# Patient Record
Sex: FEMALE | Race: WHITE | NOT HISPANIC OR LATINO | Employment: UNEMPLOYED | URBAN - NONMETROPOLITAN AREA
[De-identification: names, ages, dates, MRNs, and addresses within clinical notes are randomized per-mention and may not be internally consistent; named-entity substitution may affect disease eponyms.]

---

## 2020-02-25 ENCOUNTER — HOSPITAL ENCOUNTER (INPATIENT)
Facility: HOSPITAL | Age: 25
LOS: 6 days | Discharge: NURSING, REHAB OR LONG-TERM CARE | End: 2020-03-02
Attending: PSYCHIATRY & NEUROLOGY | Admitting: PSYCHIATRY & NEUROLOGY

## 2020-02-25 ENCOUNTER — HOSPITAL ENCOUNTER (EMERGENCY)
Facility: HOSPITAL | Age: 25
Discharge: STILL ADMITTED OR ADMINISTRATIVE | End: 2020-02-25
Attending: EMERGENCY MEDICINE

## 2020-02-25 VITALS
SYSTOLIC BLOOD PRESSURE: 115 MMHG | DIASTOLIC BLOOD PRESSURE: 70 MMHG | TEMPERATURE: 98.6 F | WEIGHT: 103 LBS | HEIGHT: 64 IN | HEART RATE: 103 BPM | RESPIRATION RATE: 20 BRPM | BODY MASS INDEX: 17.58 KG/M2 | OXYGEN SATURATION: 98 %

## 2020-02-25 DIAGNOSIS — F11.10 OPIATE ABUSE, CONTINUOUS (HCC): Primary | ICD-10-CM

## 2020-02-25 DIAGNOSIS — F41.0 SEVERE ANXIETY WITH PANIC: Primary | ICD-10-CM

## 2020-02-25 PROBLEM — F29 PSYCHOSIS (HCC): Status: ACTIVE | Noted: 2020-02-25

## 2020-02-25 LAB
6-ACETYL MORPHINE: NEGATIVE
ALBUMIN SERPL-MCNC: 4.14 G/DL (ref 3.5–5.2)
ALBUMIN/GLOB SERPL: 1.4 G/DL
ALP SERPL-CCNC: 97 U/L (ref 39–117)
ALT SERPL W P-5'-P-CCNC: 9 U/L (ref 1–33)
AMPHET+METHAMPHET UR QL: NEGATIVE
ANION GAP SERPL CALCULATED.3IONS-SCNC: 10.7 MMOL/L (ref 5–15)
AST SERPL-CCNC: 18 U/L (ref 1–32)
B-HCG UR QL: NEGATIVE
BACTERIA UR QL AUTO: ABNORMAL /HPF
BARBITURATES UR QL SCN: NEGATIVE
BASOPHILS # BLD AUTO: 0.06 10*3/MM3 (ref 0–0.2)
BASOPHILS NFR BLD AUTO: 0.7 % (ref 0–1.5)
BENZODIAZ UR QL SCN: NEGATIVE
BILIRUB SERPL-MCNC: <0.2 MG/DL (ref 0.2–1.2)
BILIRUB UR QL STRIP: NEGATIVE
BUN BLD-MCNC: 8 MG/DL (ref 6–20)
BUN/CREAT SERPL: 9.5 (ref 7–25)
BUPRENORPHINE SERPL-MCNC: POSITIVE NG/ML
CALCIUM SPEC-SCNC: 9.1 MG/DL (ref 8.6–10.5)
CANNABINOIDS SERPL QL: NEGATIVE
CHLORIDE SERPL-SCNC: 101 MMOL/L (ref 98–107)
CLARITY UR: ABNORMAL
CO2 SERPL-SCNC: 25.3 MMOL/L (ref 22–29)
COCAINE UR QL: NEGATIVE
COLOR UR: YELLOW
CREAT BLD-MCNC: 0.84 MG/DL (ref 0.57–1)
DEPRECATED RDW RBC AUTO: 41.4 FL (ref 37–54)
EOSINOPHIL # BLD AUTO: 0.73 10*3/MM3 (ref 0–0.4)
EOSINOPHIL NFR BLD AUTO: 8 % (ref 0.3–6.2)
ERYTHROCYTE [DISTWIDTH] IN BLOOD BY AUTOMATED COUNT: 12.2 % (ref 12.3–15.4)
ETHANOL BLD-MCNC: <10 MG/DL (ref 0–10)
ETHANOL UR QL: <0.01 %
GFR SERPL CREATININE-BSD FRML MDRD: 82 ML/MIN/1.73
GLOBULIN UR ELPH-MCNC: 3 GM/DL
GLUCOSE BLD-MCNC: 117 MG/DL (ref 65–99)
GLUCOSE UR STRIP-MCNC: NEGATIVE MG/DL
HAV IGM SERPL QL IA: NORMAL
HBV CORE IGM SERPL QL IA: NORMAL
HBV SURFACE AG SERPL QL IA: NORMAL
HCT VFR BLD AUTO: 36.1 % (ref 34–46.6)
HCV AB SER DONR QL: NORMAL
HGB BLD-MCNC: 11.5 G/DL (ref 12–15.9)
HGB UR QL STRIP.AUTO: NEGATIVE
HIV1+2 AB SER QL: NORMAL
HYALINE CASTS UR QL AUTO: ABNORMAL /LPF
IMM GRANULOCYTES # BLD AUTO: 0.03 10*3/MM3 (ref 0–0.05)
IMM GRANULOCYTES NFR BLD AUTO: 0.3 % (ref 0–0.5)
KETONES UR QL STRIP: NEGATIVE
LEUKOCYTE ESTERASE UR QL STRIP.AUTO: ABNORMAL
LYMPHOCYTES # BLD AUTO: 3.09 10*3/MM3 (ref 0.7–3.1)
LYMPHOCYTES NFR BLD AUTO: 34 % (ref 19.6–45.3)
MAGNESIUM SERPL-MCNC: 2.1 MG/DL (ref 1.6–2.6)
MCH RBC QN AUTO: 29.3 PG (ref 26.6–33)
MCHC RBC AUTO-ENTMCNC: 31.9 G/DL (ref 31.5–35.7)
MCV RBC AUTO: 91.9 FL (ref 79–97)
METHADONE UR QL SCN: NEGATIVE
MONOCYTES # BLD AUTO: 0.69 10*3/MM3 (ref 0.1–0.9)
MONOCYTES NFR BLD AUTO: 7.6 % (ref 5–12)
NEUTROPHILS # BLD AUTO: 4.48 10*3/MM3 (ref 1.7–7)
NEUTROPHILS NFR BLD AUTO: 49.4 % (ref 42.7–76)
NITRITE UR QL STRIP: NEGATIVE
NRBC BLD AUTO-RTO: 0 /100 WBC (ref 0–0.2)
OPIATES UR QL: NEGATIVE
OXYCODONE UR QL SCN: NEGATIVE
PCP UR QL SCN: NEGATIVE
PH UR STRIP.AUTO: >=9 [PH] (ref 5–8)
PLATELET # BLD AUTO: 337 10*3/MM3 (ref 140–450)
PMV BLD AUTO: 9.5 FL (ref 6–12)
POTASSIUM BLD-SCNC: 4.4 MMOL/L (ref 3.5–5.2)
PROT SERPL-MCNC: 7.1 G/DL (ref 6–8.5)
PROT UR QL STRIP: NEGATIVE
RBC # BLD AUTO: 3.93 10*6/MM3 (ref 3.77–5.28)
RBC # UR: ABNORMAL /HPF
REF LAB TEST METHOD: ABNORMAL
SODIUM BLD-SCNC: 137 MMOL/L (ref 136–145)
SP GR UR STRIP: 1.01 (ref 1–1.03)
SQUAMOUS #/AREA URNS HPF: ABNORMAL /HPF
TRI-PHOS CRY URNS QL MICRO: ABNORMAL /HPF
UROBILINOGEN UR QL STRIP: ABNORMAL
WBC NRBC COR # BLD: 9.08 10*3/MM3 (ref 3.4–10.8)
WBC UR QL AUTO: ABNORMAL /HPF

## 2020-02-25 PROCEDURE — 80307 DRUG TEST PRSMV CHEM ANLYZR: CPT | Performed by: EMERGENCY MEDICINE

## 2020-02-25 PROCEDURE — 81001 URINALYSIS AUTO W/SCOPE: CPT | Performed by: EMERGENCY MEDICINE

## 2020-02-25 PROCEDURE — 80053 COMPREHEN METABOLIC PANEL: CPT | Performed by: EMERGENCY MEDICINE

## 2020-02-25 PROCEDURE — 85025 COMPLETE CBC W/AUTO DIFF WBC: CPT | Performed by: EMERGENCY MEDICINE

## 2020-02-25 PROCEDURE — 81025 URINE PREGNANCY TEST: CPT | Performed by: EMERGENCY MEDICINE

## 2020-02-25 PROCEDURE — 83735 ASSAY OF MAGNESIUM: CPT | Performed by: EMERGENCY MEDICINE

## 2020-02-25 PROCEDURE — 93010 ELECTROCARDIOGRAM REPORT: CPT | Performed by: INTERNAL MEDICINE

## 2020-02-25 PROCEDURE — 25010000002 INFLUENZA VAC SUBUNIT QUAD 0.5 ML SUSPENSION PREFILLED SYRINGE: Performed by: PSYCHIATRY & NEUROLOGY

## 2020-02-25 PROCEDURE — 93005 ELECTROCARDIOGRAM TRACING: CPT | Performed by: PSYCHIATRY & NEUROLOGY

## 2020-02-25 PROCEDURE — 80074 ACUTE HEPATITIS PANEL: CPT | Performed by: PSYCHIATRY & NEUROLOGY

## 2020-02-25 PROCEDURE — G0008 ADMIN INFLUENZA VIRUS VAC: HCPCS | Performed by: PSYCHIATRY & NEUROLOGY

## 2020-02-25 PROCEDURE — 90674 CCIIV4 VAC NO PRSV 0.5 ML IM: CPT | Performed by: PSYCHIATRY & NEUROLOGY

## 2020-02-25 PROCEDURE — G0432 EIA HIV-1/HIV-2 SCREEN: HCPCS | Performed by: PSYCHIATRY & NEUROLOGY

## 2020-02-25 RX ORDER — ONDANSETRON 4 MG/1
4 TABLET, ORALLY DISINTEGRATING ORAL EVERY 8 HOURS PRN
COMMUNITY
End: 2020-03-02 | Stop reason: HOSPADM

## 2020-02-25 RX ORDER — BENZTROPINE MESYLATE 1 MG/ML
1 INJECTION INTRAMUSCULAR; INTRAVENOUS ONCE AS NEEDED
Status: DISCONTINUED | OUTPATIENT
Start: 2020-02-25 | End: 2020-02-26

## 2020-02-25 RX ORDER — CYCLOBENZAPRINE HCL 10 MG
10 TABLET ORAL EVERY 8 HOURS PRN
COMMUNITY
End: 2020-03-02 | Stop reason: HOSPADM

## 2020-02-25 RX ORDER — CYCLOBENZAPRINE HCL 10 MG
10 TABLET ORAL EVERY 8 HOURS PRN
Status: CANCELLED | OUTPATIENT
Start: 2020-02-25

## 2020-02-25 RX ORDER — ACETAMINOPHEN 325 MG/1
650 TABLET ORAL EVERY 6 HOURS PRN
Status: DISCONTINUED | OUTPATIENT
Start: 2020-02-25 | End: 2020-03-02 | Stop reason: HOSPADM

## 2020-02-25 RX ORDER — ACETAMINOPHEN 500 MG
500 TABLET ORAL EVERY 4 HOURS PRN
COMMUNITY
End: 2020-03-02 | Stop reason: HOSPADM

## 2020-02-25 RX ORDER — BENZTROPINE MESYLATE 1 MG/1
2 TABLET ORAL ONCE AS NEEDED
Status: DISCONTINUED | OUTPATIENT
Start: 2020-02-25 | End: 2020-02-26

## 2020-02-25 RX ORDER — CALCIUM CARBONATE 200(500)MG
2 TABLET,CHEWABLE ORAL
Status: CANCELLED | OUTPATIENT
Start: 2020-02-25

## 2020-02-25 RX ORDER — HYDROXYZINE 50 MG/1
50 TABLET, FILM COATED ORAL EVERY 6 HOURS PRN
Status: DISCONTINUED | OUTPATIENT
Start: 2020-02-25 | End: 2020-02-26

## 2020-02-25 RX ORDER — CALCIUM CARBONATE 200(500)MG
2 TABLET,CHEWABLE ORAL
Status: DISCONTINUED | OUTPATIENT
Start: 2020-02-25 | End: 2020-03-02 | Stop reason: HOSPADM

## 2020-02-25 RX ORDER — BUSPIRONE HYDROCHLORIDE 10 MG/1
10 TABLET ORAL 2 TIMES DAILY
Status: DISCONTINUED | OUTPATIENT
Start: 2020-02-25 | End: 2020-02-26

## 2020-02-25 RX ORDER — HYDROXYZINE HYDROCHLORIDE 25 MG/1
25 TABLET, FILM COATED ORAL EVERY 8 HOURS PRN
Status: CANCELLED | OUTPATIENT
Start: 2020-02-25

## 2020-02-25 RX ORDER — FAMOTIDINE 20 MG/1
20 TABLET, FILM COATED ORAL 2 TIMES DAILY PRN
Status: DISCONTINUED | OUTPATIENT
Start: 2020-02-25 | End: 2020-03-02 | Stop reason: HOSPADM

## 2020-02-25 RX ORDER — ACETAMINOPHEN 500 MG
500 TABLET ORAL EVERY 4 HOURS PRN
Status: CANCELLED | OUTPATIENT
Start: 2020-02-25

## 2020-02-25 RX ORDER — ONDANSETRON 4 MG/1
4 TABLET, FILM COATED ORAL EVERY 6 HOURS PRN
Status: DISCONTINUED | OUTPATIENT
Start: 2020-02-25 | End: 2020-03-02 | Stop reason: HOSPADM

## 2020-02-25 RX ORDER — BUSPIRONE HYDROCHLORIDE 10 MG/1
10 TABLET ORAL 2 TIMES DAILY
COMMUNITY
End: 2020-03-02 | Stop reason: HOSPADM

## 2020-02-25 RX ORDER — BENZONATATE 100 MG/1
100 CAPSULE ORAL 3 TIMES DAILY PRN
Status: DISCONTINUED | OUTPATIENT
Start: 2020-02-25 | End: 2020-03-02 | Stop reason: HOSPADM

## 2020-02-25 RX ORDER — ALUMINA, MAGNESIA, AND SIMETHICONE 2400; 2400; 240 MG/30ML; MG/30ML; MG/30ML
15 SUSPENSION ORAL EVERY 6 HOURS PRN
Status: DISCONTINUED | OUTPATIENT
Start: 2020-02-25 | End: 2020-03-02 | Stop reason: HOSPADM

## 2020-02-25 RX ORDER — MAGNESIUM HYDROXIDE/ALUMINUM HYDROXICE/SIMETHICONE 120; 1200; 1200 MG/30ML; MG/30ML; MG/30ML
30 SUSPENSION ORAL EVERY 4 HOURS PRN
Status: CANCELLED | OUTPATIENT
Start: 2020-02-25

## 2020-02-25 RX ORDER — TRAZODONE HYDROCHLORIDE 50 MG/1
50 TABLET ORAL NIGHTLY PRN
Status: DISCONTINUED | OUTPATIENT
Start: 2020-02-25 | End: 2020-02-28

## 2020-02-25 RX ORDER — IBUPROFEN 400 MG/1
400 TABLET ORAL EVERY 6 HOURS PRN
Status: DISCONTINUED | OUTPATIENT
Start: 2020-02-25 | End: 2020-02-26

## 2020-02-25 RX ORDER — DOXEPIN HYDROCHLORIDE 50 MG/1
50 CAPSULE ORAL NIGHTLY PRN
COMMUNITY
End: 2020-03-02 | Stop reason: HOSPADM

## 2020-02-25 RX ORDER — MAGNESIUM HYDROXIDE/ALUMINUM HYDROXICE/SIMETHICONE 120; 1200; 1200 MG/30ML; MG/30ML; MG/30ML
30 SUSPENSION ORAL EVERY 4 HOURS PRN
COMMUNITY
End: 2020-03-02 | Stop reason: HOSPADM

## 2020-02-25 RX ORDER — LORAZEPAM 2 MG/ML
2 INJECTION INTRAMUSCULAR EVERY 8 HOURS PRN
Status: DISCONTINUED | OUTPATIENT
Start: 2020-02-25 | End: 2020-02-26

## 2020-02-25 RX ORDER — CALCIUM CARBONATE 200(500)MG
2 TABLET,CHEWABLE ORAL
COMMUNITY
End: 2020-03-02 | Stop reason: HOSPADM

## 2020-02-25 RX ORDER — CYCLOBENZAPRINE HCL 10 MG
10 TABLET ORAL EVERY 8 HOURS PRN
Status: DISCONTINUED | OUTPATIENT
Start: 2020-02-25 | End: 2020-03-02 | Stop reason: HOSPADM

## 2020-02-25 RX ORDER — DOXEPIN HYDROCHLORIDE 25 MG/1
50 CAPSULE ORAL NIGHTLY PRN
Status: DISCONTINUED | OUTPATIENT
Start: 2020-02-25 | End: 2020-02-26

## 2020-02-25 RX ORDER — BUSPIRONE HYDROCHLORIDE 10 MG/1
10 TABLET ORAL 2 TIMES DAILY
Status: CANCELLED | OUTPATIENT
Start: 2020-02-25

## 2020-02-25 RX ORDER — HALOPERIDOL 5 MG/ML
5 INJECTION INTRAMUSCULAR EVERY 8 HOURS PRN
Status: DISCONTINUED | OUTPATIENT
Start: 2020-02-25 | End: 2020-02-26

## 2020-02-25 RX ORDER — ECHINACEA PURPUREA EXTRACT 125 MG
2 TABLET ORAL AS NEEDED
Status: DISCONTINUED | OUTPATIENT
Start: 2020-02-25 | End: 2020-03-02 | Stop reason: HOSPADM

## 2020-02-25 RX ORDER — HYDROXYZINE PAMOATE 25 MG/1
25 CAPSULE ORAL EVERY 8 HOURS PRN
COMMUNITY
End: 2020-03-02 | Stop reason: HOSPADM

## 2020-02-25 RX ORDER — NICOTINE 21 MG/24HR
1 PATCH, TRANSDERMAL 24 HOURS TRANSDERMAL
Status: DISCONTINUED | OUTPATIENT
Start: 2020-02-25 | End: 2020-03-02 | Stop reason: HOSPADM

## 2020-02-25 RX ORDER — ONDANSETRON 4 MG/1
4 TABLET, ORALLY DISINTEGRATING ORAL EVERY 8 HOURS PRN
Status: CANCELLED | OUTPATIENT
Start: 2020-02-25

## 2020-02-25 RX ORDER — DOXEPIN HYDROCHLORIDE 25 MG/1
50 CAPSULE ORAL NIGHTLY PRN
Status: CANCELLED | OUTPATIENT
Start: 2020-02-25

## 2020-02-25 RX ORDER — LOPERAMIDE HYDROCHLORIDE 2 MG/1
2 CAPSULE ORAL
Status: DISCONTINUED | OUTPATIENT
Start: 2020-02-25 | End: 2020-03-02 | Stop reason: HOSPADM

## 2020-02-25 RX ORDER — DIPHENHYDRAMINE HYDROCHLORIDE 50 MG/ML
50 INJECTION INTRAMUSCULAR; INTRAVENOUS EVERY 8 HOURS PRN
Status: ACTIVE | OUTPATIENT
Start: 2020-02-25 | End: 2020-03-01

## 2020-02-25 RX ADMIN — ACETAMINOPHEN 650 MG: 325 TABLET ORAL at 17:32

## 2020-02-25 RX ADMIN — HYDROXYZINE HYDROCHLORIDE 50 MG: 50 TABLET ORAL at 17:32

## 2020-02-25 RX ADMIN — BUSPIRONE HYDROCHLORIDE 10 MG: 10 TABLET ORAL at 21:14

## 2020-02-25 RX ADMIN — NICOTINE 1 PATCH: 21 PATCH TRANSDERMAL at 17:34

## 2020-02-25 RX ADMIN — INFLUENZA A VIRUS A/SINGAPORE/GP1908/2015 IVR-180 (H1N1) ANTIGEN (MDCK CELL DERIVED, PROPIOLACTONE INACTIVATED), INFLUENZA A VIRUS A/NORTH CAROLINA/04/2016 (H3N2) HEMAGGLUTININ ANTIGEN (MDCK CELL DERIVED, PROPIOLACTONE INACTIVATED), INFLUENZA B VIRUS B/IOWA/06/2017 HEMAGGLUTININ ANTIGEN (MDCK CELL DERIVED, PROPIOLACTONE INACTIVATED), INFLUENZA B VIRUS B/SINGAPORE/INFTT-16-0610/2016 HEMAGGLUTININ ANTIGEN (MDCK CELL DERIVED, PROPIOLACTONE INACTIVATED) 0.5 ML: 15; 15; 15; 15 INJECTION, SUSPENSION INTRAMUSCULAR at 21:13

## 2020-02-25 RX ADMIN — TRAZODONE HYDROCHLORIDE 50 MG: 50 TABLET ORAL at 21:17

## 2020-02-26 PROBLEM — F11.10 OPIATE ABUSE, CONTINUOUS: Status: ACTIVE | Noted: 2020-02-26

## 2020-02-26 PROBLEM — F20.1: Status: ACTIVE | Noted: 2020-02-26

## 2020-02-26 PROBLEM — F15.10 METHAMPHETAMINE ABUSE (HCC): Status: ACTIVE | Noted: 2020-02-26

## 2020-02-26 PROBLEM — F19.20 POLYSUBSTANCE DEPENDENCE INCLUDING OPIOID TYPE DRUG WITH COMPLICATION, EPISODIC ABUSE: Status: ACTIVE | Noted: 2020-02-26

## 2020-02-26 LAB — HOLD SPECIMEN: NORMAL

## 2020-02-26 PROCEDURE — 99223 1ST HOSP IP/OBS HIGH 75: CPT | Performed by: PSYCHIATRY & NEUROLOGY

## 2020-02-26 RX ORDER — BUPRENORPHINE HYDROCHLORIDE AND NALOXONE HYDROCHLORIDE DIHYDRATE 8; 2 MG/1; MG/1
0.5 TABLET SUBLINGUAL DAILY
Status: DISCONTINUED | OUTPATIENT
Start: 2020-02-26 | End: 2020-03-02 | Stop reason: HOSPADM

## 2020-02-26 RX ORDER — ARIPIPRAZOLE 10 MG/1
5 TABLET ORAL DAILY
Status: DISCONTINUED | OUTPATIENT
Start: 2020-02-26 | End: 2020-02-28

## 2020-02-26 RX ORDER — OLANZAPINE 5 MG/1
5 TABLET, ORALLY DISINTEGRATING ORAL 3 TIMES DAILY PRN
Status: DISCONTINUED | OUTPATIENT
Start: 2020-02-26 | End: 2020-03-02 | Stop reason: HOSPADM

## 2020-02-26 RX ADMIN — BUSPIRONE HYDROCHLORIDE 10 MG: 10 TABLET ORAL at 08:13

## 2020-02-26 RX ADMIN — ARIPIPRAZOLE 5 MG: 10 TABLET ORAL at 14:21

## 2020-02-26 RX ADMIN — HYDROXYZINE HYDROCHLORIDE 50 MG: 50 TABLET ORAL at 11:48

## 2020-02-26 RX ADMIN — NICOTINE 1 PATCH: 21 PATCH TRANSDERMAL at 08:13

## 2020-02-26 RX ADMIN — ACETAMINOPHEN 650 MG: 325 TABLET ORAL at 14:21

## 2020-02-26 RX ADMIN — BUPRENORPHINE HYDROCHLORIDE AND NALOXONE HYDROCHLORIDE 0.5 TABLET: 8; 2 TABLET SUBLINGUAL at 14:22

## 2020-02-26 RX ADMIN — ACETAMINOPHEN 650 MG: 325 TABLET ORAL at 20:03

## 2020-02-26 RX ADMIN — TRAZODONE HYDROCHLORIDE 50 MG: 50 TABLET ORAL at 20:03

## 2020-02-26 RX ADMIN — ACETAMINOPHEN 650 MG: 325 TABLET ORAL at 08:13

## 2020-02-26 RX ADMIN — OLANZAPINE 5 MG: 5 TABLET, ORALLY DISINTEGRATING ORAL at 20:03

## 2020-02-27 PROCEDURE — 99232 SBSQ HOSP IP/OBS MODERATE 35: CPT | Performed by: PSYCHIATRY & NEUROLOGY

## 2020-02-27 PROCEDURE — 63710000001 ONDANSETRON PER 8 MG: Performed by: PSYCHIATRY & NEUROLOGY

## 2020-02-27 RX ADMIN — ARIPIPRAZOLE 5 MG: 10 TABLET ORAL at 08:32

## 2020-02-27 RX ADMIN — TRAZODONE HYDROCHLORIDE 50 MG: 50 TABLET ORAL at 20:04

## 2020-02-27 RX ADMIN — ONDANSETRON 4 MG: 4 TABLET ORAL at 13:18

## 2020-02-27 RX ADMIN — NICOTINE 1 PATCH: 21 PATCH TRANSDERMAL at 08:37

## 2020-02-27 RX ADMIN — ACETAMINOPHEN 650 MG: 325 TABLET ORAL at 14:32

## 2020-02-27 RX ADMIN — ACETAMINOPHEN 650 MG: 325 TABLET ORAL at 20:05

## 2020-02-27 RX ADMIN — ACETAMINOPHEN 650 MG: 325 TABLET ORAL at 08:37

## 2020-02-27 RX ADMIN — OLANZAPINE 5 MG: 5 TABLET, ORALLY DISINTEGRATING ORAL at 20:04

## 2020-02-27 RX ADMIN — BUPRENORPHINE HYDROCHLORIDE AND NALOXONE HYDROCHLORIDE 0.5 TABLET: 8; 2 TABLET SUBLINGUAL at 09:30

## 2020-02-28 PROCEDURE — 25010000003 ARIPIPRAZOLE LAUROXIL ER 675 MG/2.4ML PREFILLED SYRINGE: Performed by: PSYCHIATRY & NEUROLOGY

## 2020-02-28 PROCEDURE — 99233 SBSQ HOSP IP/OBS HIGH 50: CPT | Performed by: PSYCHIATRY & NEUROLOGY

## 2020-02-28 PROCEDURE — 25010000003 ARIPIPRAZOLE LAUROXIL ER 662 MG/2.4ML PREFILLED SYRINGE: Performed by: PSYCHIATRY & NEUROLOGY

## 2020-02-28 RX ORDER — ARIPIPRAZOLE 15 MG/1
30 TABLET ORAL DAILY
Status: COMPLETED | OUTPATIENT
Start: 2020-02-28 | End: 2020-02-28

## 2020-02-28 RX ORDER — CLONAZEPAM 0.5 MG/1
0.5 TABLET ORAL 2 TIMES DAILY
Status: COMPLETED | OUTPATIENT
Start: 2020-02-28 | End: 2020-03-01

## 2020-02-28 RX ORDER — TRAZODONE HYDROCHLORIDE 50 MG/1
100 TABLET ORAL NIGHTLY PRN
Status: DISCONTINUED | OUTPATIENT
Start: 2020-02-28 | End: 2020-03-02 | Stop reason: HOSPADM

## 2020-02-28 RX ADMIN — TRAZODONE HYDROCHLORIDE 100 MG: 50 TABLET ORAL at 20:14

## 2020-02-28 RX ADMIN — CLONAZEPAM 0.5 MG: 0.5 TABLET ORAL at 13:00

## 2020-02-28 RX ADMIN — ACETAMINOPHEN 650 MG: 325 TABLET ORAL at 08:41

## 2020-02-28 RX ADMIN — ARIPIPRAZOLE 5 MG: 10 TABLET ORAL at 08:41

## 2020-02-28 RX ADMIN — NICOTINE 1 PATCH: 21 PATCH TRANSDERMAL at 08:43

## 2020-02-28 RX ADMIN — ACETAMINOPHEN 650 MG: 325 TABLET ORAL at 15:42

## 2020-02-28 RX ADMIN — ARIPIPRAZOLE LAUROXIL 675 MG: 675 INJECTION, SUSPENSION, EXTENDED RELEASE INTRAMUSCULAR at 16:57

## 2020-02-28 RX ADMIN — ARIPIPRAZOLE 30 MG: 15 TABLET ORAL at 13:18

## 2020-02-28 RX ADMIN — BUPRENORPHINE HYDROCHLORIDE AND NALOXONE HYDROCHLORIDE 0.5 TABLET: 8; 2 TABLET SUBLINGUAL at 08:40

## 2020-02-28 RX ADMIN — CLONAZEPAM 0.5 MG: 0.5 TABLET ORAL at 20:11

## 2020-02-28 RX ADMIN — ARIPIPRAZOLE LAUROXIL 662 MG: 662 INJECTION, SUSPENSION, EXTENDED RELEASE INTRAMUSCULAR at 16:56

## 2020-02-29 PROCEDURE — 99233 SBSQ HOSP IP/OBS HIGH 50: CPT | Performed by: PSYCHIATRY & NEUROLOGY

## 2020-02-29 RX ORDER — FLUTICASONE PROPIONATE 50 MCG
2 SPRAY, SUSPENSION (ML) NASAL DAILY
Status: DISCONTINUED | OUTPATIENT
Start: 2020-02-29 | End: 2020-03-02 | Stop reason: HOSPADM

## 2020-02-29 RX ORDER — EMOLLIENT COMBINATION NO.92
LOTION (ML) TOPICAL
Status: DISCONTINUED | OUTPATIENT
Start: 2020-02-29 | End: 2020-03-02 | Stop reason: HOSPADM

## 2020-02-29 RX ADMIN — ACETAMINOPHEN 650 MG: 325 TABLET ORAL at 08:29

## 2020-02-29 RX ADMIN — ACETAMINOPHEN 650 MG: 325 TABLET ORAL at 16:49

## 2020-02-29 RX ADMIN — TRAZODONE HYDROCHLORIDE 100 MG: 50 TABLET ORAL at 20:46

## 2020-02-29 RX ADMIN — BUPRENORPHINE HYDROCHLORIDE AND NALOXONE HYDROCHLORIDE 0.5 TABLET: 8; 2 TABLET SUBLINGUAL at 08:53

## 2020-02-29 RX ADMIN — CLONAZEPAM 0.5 MG: 0.5 TABLET ORAL at 20:44

## 2020-02-29 RX ADMIN — CLONAZEPAM 0.5 MG: 0.5 TABLET ORAL at 08:53

## 2020-02-29 RX ADMIN — FLUTICASONE PROPIONATE 2 SPRAY: 50 SPRAY, METERED NASAL at 16:19

## 2020-02-29 RX ADMIN — NICOTINE 1 PATCH: 21 PATCH TRANSDERMAL at 08:53

## 2020-03-01 PROCEDURE — 99232 SBSQ HOSP IP/OBS MODERATE 35: CPT | Performed by: PSYCHIATRY & NEUROLOGY

## 2020-03-01 RX ADMIN — ACETAMINOPHEN 650 MG: 325 TABLET ORAL at 08:33

## 2020-03-01 RX ADMIN — ACETAMINOPHEN 650 MG: 325 TABLET ORAL at 00:27

## 2020-03-01 RX ADMIN — CYCLOBENZAPRINE HYDROCHLORIDE 10 MG: 10 TABLET, FILM COATED ORAL at 20:27

## 2020-03-01 RX ADMIN — FLUTICASONE PROPIONATE 2 SPRAY: 50 SPRAY, METERED NASAL at 08:32

## 2020-03-01 RX ADMIN — CLONAZEPAM 0.5 MG: 0.5 TABLET ORAL at 08:33

## 2020-03-01 RX ADMIN — Medication: at 00:27

## 2020-03-01 RX ADMIN — NICOTINE 1 PATCH: 21 PATCH TRANSDERMAL at 08:33

## 2020-03-01 RX ADMIN — TRAZODONE HYDROCHLORIDE 100 MG: 50 TABLET ORAL at 20:24

## 2020-03-01 RX ADMIN — BUPRENORPHINE HYDROCHLORIDE AND NALOXONE HYDROCHLORIDE 0.5 TABLET: 8; 2 TABLET SUBLINGUAL at 08:32

## 2020-03-01 RX ADMIN — ACETAMINOPHEN 650 MG: 325 TABLET ORAL at 16:09

## 2020-03-01 NOTE — PLAN OF CARE
Problem: Patient Care Overview  Goal: Plan of Care Review  Outcome: Ongoing (interventions implemented as appropriate)  Flowsheets  Taken 3/1/2020 0444  Progress: no change  Outcome Summary: Pt rates anxiety 8/10 depression 5/10. Denies SI HI AVH.  Taken 2/29/2020 2010  Plan of Care Reviewed With: patient  Patient Agreement with Plan of Care: agrees

## 2020-03-01 NOTE — PLAN OF CARE
Problem: Patient Care Overview  Goal: Plan of Care Review  Outcome: Ongoing (interventions implemented as appropriate)  Flowsheets (Taken 3/1/2020 1524)  Progress: no change  Plan of Care Reviewed With: patient  Patient Agreement with Plan of Care: agrees  Note:   NO BEHAVIOR OUTBURSTS OR ISSUES DURING THIS SHIFT.

## 2020-03-01 NOTE — PROGRESS NOTES
"INPATIENT PSYCHIATRIC PROGRESS NOTE    Name:  Katie Lindsey  :  1995  MRN:  [id number]  Visit Number:  [id number]  Length of stay:  5    Behavioral Health Treatment Plan and Problem List: I have reviewed and approved the Behavioral Health Treatment Plan and Problem list.    SUBJECTIVE    CC/ Focus of exam: Psychosis/substance abuse    Patient's subjective status: \"Good but anxious\"    INTERVAL HISTORY:   Patient somewhat improved over yesterday with less tangential and less rapid speech.  She continues to change topics frequently but is able to maintain focus on 1 topic for an extended amount of time before switching which she is an improvement over yesterday.  She reports that her skin is improving with hydration and lotion but is not itching.  I advised that this is normal and she has Vistaril available which could help with some of the itching.  She would like to be prescribed trazodone as she finds it very helpful for sleep.    [facility] .    depression rating 3/10  Anxiety rating 2/10  Sleep: Good  Withdrawal sx: None  Craving: \"not really\".        Review of Systems   Constitutional: Negative.    Respiratory: Negative.    Cardiovascular: Negative.    Gastrointestinal: Negative.    Musculoskeletal: Negative.    Skin: Positive for rash (dry skin with rash on bilateral calves).   Neurological: Negative.    Psychiatric/Behavioral: Positive for confusion, decreased concentration, dysphoric mood, hallucinations and sleep disturbance (improved). The patient is nervous/anxious and is hyperactive.          OBJECTIVE    Temp:  [97.6 °F (36.4 °C)-98.1 °F (36.7 °C)] 98.1 °F (36.7 °C)  Heart Rate:  [] 100  Resp:  [18] 18  BP: ()/(59-69) 102/63    MENTAL STATUS EXAM:      Appearance:Casually dressed, good hygeine.   Cooperation:Cooperative  Psychomotor: No psychomotor agitation/retardation, No EPS, No motor tics  Speech-elevated rate, " tangential   Mood/Affect:  Elevated  Thought Processes:  tangential and coherent  Thought Content:  Delusional but improving  Hallucination(s): auditory and visual though not demonstrated today  Hopelessness: No  Optimistic:minimally  Suicidal Thoughts:   Not present  Suicidal Plan/Intent:  No  Homicidal Thoughts:  absent  Orientation: oriented x 3  Memory: Immediate, recent, recent remote, remote intact    Lab Results (last 24 hours)     ** No results found for the last 24 hours. **           Imaging Results (Last 24 Hours)     ** No results found for the last 24 hours. **           ECG/EMG Results (most recent)     Procedure Component Value Units Date/Time    ECG 12 Lead [[id number]] Collected:  02/25/20 1739     Updated:  02/26/20 0935    Narrative:       Test Reason : Baseline Cardiac Status  Blood Pressure : **/** mmHG  Vent. Rate : 086 BPM     Atrial Rate : 086 BPM     P-R Int : 156 ms          QRS Dur : 086 ms      QT Int : 382 ms       P-R-T Axes : 058 076 048 degrees     QTc Int : 457 ms    Normal sinus rhythm  Normal ECG  No previous ECGs available  Confirmed by Matthew Aleman ([id number]) on 2/26/2020 9:34:55 AM    Referred By:  KAILEY           Confirmed By:Matthew Aleman           ALLERGIES: Codeine; Ibuprofen; and Penicillins      Current Facility-Administered Medications:   •  acetaminophen (TYLENOL) tablet 650 mg, 650 mg, Oral, Q6H PRN, Iggy Underwood MD, 650 mg at 03/01/20 0833  •  aluminum-magnesium hydroxide-simethicone (MAALOX MAX) 400-400-40 MG/5ML suspension 15 mL, 15 mL, Oral, Q6H PRN, Iggy Underwood MD  •  benzonatate (TESSALON) capsule 100 mg, 100 mg, Oral, TID PRN, Iggy Underwood MD  •  buprenorphine-naloxone (SUBOXONE) 8-2 MG per SL tablet 0.5 tablet, 0.5 tablet, Sublingual, Daily, Augustine Arguello MD, 0.5 tablet at 03/01/20 0832  •  calcium carbonate (TUMS) chewable tablet 500 mg (200 mg elemental), 2 tablet, Oral, Q2H PRN, Iggy Underwood MD  •  cyclobenzaprine  (FLEXERIL) tablet 10 mg, 10 mg, Oral, Q8H PRN, Iggy Underwood MD  •  diphenhydrAMINE (BENADRYL) injection 50 mg, 50 mg, Intramuscular, Q8H PRN, Iggy Underwood MD  •  famotidine (PEPCID) tablet 20 mg, 20 mg, Oral, BID PRN, Iggy Underwood MD  •  fluticasone (FLONASE) 50 MCG/ACT nasal spray 2 spray, 2 spray, Each Nare, Daily, Iggy Underwood MD, 2 spray at 03/01/20 0832  •  loperamide (IMODIUM) capsule 2 mg, 2 mg, Oral, Q2H PRN, Iggy Underwood MD  •  lubrisoft lotion, , Apply externally, Q1H PRN, Iggy Underwood MD  •  magnesium hydroxide (MILK OF MAGNESIA) suspension 2400 mg/10mL 10 mL, 10 mL, Oral, Daily PRN, Iggy Underwood MD  •  nicotine (NICODERM CQ) 21 MG/24HR patch 1 patch, 1 patch, Transdermal, Q24H, Iggy Underwood MD, 1 patch at 03/01/20 0833  •  OLANZapine zydis (zyPREXA) disintegrating tablet 5 mg, 5 mg, Oral, TID PRN, Augustine Arguello MD, 5 mg at 02/27/20 2004  •  ondansetron (ZOFRAN) tablet 4 mg, 4 mg, Oral, Q6H PRN, Iggy Underwood MD, 4 mg at 02/27/20 1318  •  sodium chloride nasal spray 2 spray, 2 spray, Each Nare, PRN, Iggy Underwood MD  •  traZODone (DESYREL) tablet 100 mg, 100 mg, Oral, Nightly PRN, Gt Salgado MD, 100 mg at 02/29/20 2046    First time seeing patient.  Chart, notes, vitals, labs and EKG personally reviewed.      ASSESSMENT & PLAN  Schizophrenia, hebephrenic type (CMS/HCC)   -Transitioned to COHEN aripiprazole yesterday, Aristada and one-time 30 mg p.o. Dose  -Patient continues to be tangential and highly anxious.    - Placed on brief clonazepam taper for anxiety  - individual and group psychotherapy      Polysubstance dependence including opioid type drug with complication, episodic abuse (CMS/HCC)  To include recovery principles in the psychotherapeutic approach plus anticipate [facility], patient wants to continue the Suboxone MTA approach      Methamphetamine abuse  (CMS/[facility])  Include recoverage principles and psychotherapeutic approach      Opiate abuse, continuous on agonist therapy (CMS/[facility])  Same as above including the Suboxone.    Dry skin with associated rash on bilateral calves  -Started lotion as needed for hydration  -That dry skin is improving, patient experiencing some itching.  Advised that she had Vistaril as needed which would help with itching as well as anxiety.    Cough with upper respiratory congestion  -Started Flonase PRN     Insomnia  -Patient reports that PRN trazodone has been helpful and would like a prescription for this at discharge    Special precautions: Special Precautions Level 3 (q15 min checks)     Behavioral Health Treatment Plan and Problem List: I have reviewed and approved the Behavioral Health Treatment Plan and Problem list.    Clinician:  Iggy Underwood MD  03/01/20  1:39 PM    Dictated utilizing Dragon dictation

## 2020-03-02 VITALS
TEMPERATURE: 97.6 F | HEIGHT: 64 IN | BODY MASS INDEX: 17.55 KG/M2 | OXYGEN SATURATION: 97 % | RESPIRATION RATE: 18 BRPM | WEIGHT: 102.8 LBS | HEART RATE: 93 BPM | DIASTOLIC BLOOD PRESSURE: 59 MMHG | SYSTOLIC BLOOD PRESSURE: 95 MMHG

## 2020-03-02 PROCEDURE — 99239 HOSP IP/OBS DSCHRG MGMT >30: CPT | Performed by: PSYCHIATRY & NEUROLOGY

## 2020-03-02 RX ORDER — EMOLLIENT COMBINATION NO.92
LOTION (ML) TOPICAL
Qty: 473 ML | Refills: 0 | Status: ON HOLD | OUTPATIENT
Start: 2020-03-02 | End: 2020-04-02

## 2020-03-02 RX ORDER — TRAZODONE HYDROCHLORIDE 100 MG/1
100 TABLET ORAL NIGHTLY
Qty: 30 TABLET | Refills: 0 | Status: ON HOLD | OUTPATIENT
Start: 2020-03-02 | End: 2020-04-02

## 2020-03-02 RX ORDER — BUPRENORPHINE HYDROCHLORIDE AND NALOXONE HYDROCHLORIDE DIHYDRATE 8; 2 MG/1; MG/1
0.5 TABLET SUBLINGUAL DAILY
Qty: 15 TABLET | Refills: 0 | Status: ON HOLD | OUTPATIENT
Start: 2020-03-03 | End: 2020-04-02

## 2020-03-02 RX ADMIN — NICOTINE 1 PATCH: 21 PATCH TRANSDERMAL at 08:37

## 2020-03-02 RX ADMIN — BUPRENORPHINE HYDROCHLORIDE AND NALOXONE HYDROCHLORIDE 0.5 TABLET: 8; 2 TABLET SUBLINGUAL at 08:37

## 2020-03-02 RX ADMIN — ACETAMINOPHEN 650 MG: 325 TABLET ORAL at 05:26

## 2020-03-02 RX ADMIN — ACETAMINOPHEN 650 MG: 325 TABLET ORAL at 11:58

## 2020-03-02 RX ADMIN — FLUTICASONE PROPIONATE 2 SPRAY: 50 SPRAY, METERED NASAL at 08:38

## 2020-03-02 NOTE — PROGRESS NOTES
..Bridge Session  Date: 03/02/2020   Time: 1100    Data:  Reason for Inpatient Admission: Anxiety, suicidal ideation, psychosis    Follow up: [facility]  [address] [place] [address]  [phone]    March 2 2020 at 2:00pm        Coping Skills to Utilize: Patient encouraged to use thought reframing, exercise and engaging her relapse prevention planning while attending [employer]    Crisis Safety Plan:  • Support System to utilize and contact numbers: Patient reports that she plans to return to [facility] and reports the staff there are supportive    • Educated on crisis hotline numbers (yes/no): Yes    • Was the Patient made aware of contact information for the following: community mental health centers, crisis stabilization programs, residential programs, , etc (yes/no): Yes    Will transportation be a barrier (yes/no): No  • If so, explain solution(s) to resolve barrier: n/a    • How and where will the patient obtain prescribed medications: Patient's medications were filled today by [facility] and brought to patient.  The cost of the medications was covered by her insurance.    Assessment: Patient denies suicidal ideation today and denies homicidal ideation today.  Patient does not appear to be responding to internal stimuli.  She reports decreased anxiety and depression.  She verbalized being ready [facility].  She verbalizes understanding of the importance of safety planning and aftercare.    Plan:    Discussed the importance of follow up treatment for continuity of care. The Patient was able to verbalize understanding and commitment to the individualized aftercare and crisis safety plan.      Trudy Hogan, [facility]

## 2020-03-02 NOTE — PROGRESS NOTES
1220:     Therapist staffed case with Dr. Salgado and met with patient at bedside. Patient requests to be discharged on Klonopin.  Therapist/navigator contacted [facility] and learned that they do not allow Klonopin.  Patient was educated this date.  Patient was encouraged to comply with [facility] and to focus on healthy coping skills for anxiety. Patient receptive. Patient denies SI/HI and agrees to enter [facility] at 2 p.m.    Assisted patient in identifying risk factors which would indicate the need for higher level of care including thoughts to harm self or others and/or self-harming behavior and encouraged patient to call 911, or present to the nearest emergency room should any of these events occur. Discussed crisis intervention services and means to access.  Patient adamantly and convincingly denies current suicidal or homicidal ideation or perceptual disturbance.

## 2020-03-02 NOTE — PLAN OF CARE
Problem: Patient Care Overview  Goal: Plan of Care Review  Outcome: Ongoing (interventions implemented as appropriate)  Flowsheets  Taken 3/2/2020 0223  Progress: no change  Outcome Summary: Pt rates anxiety 6/10 depression 5/10. Denies SI HI AVH.  Taken 3/1/2020 1950  Plan of Care Reviewed With: patient  Patient Agreement with Plan of Care: agrees

## 2020-03-07 NOTE — DISCHARGE SUMMARY
"      PSYCHIATRIC DISCHARGE SUMMARY     Patient Identification:  Name:  Katie Lindsey  Age:  24 y.o.  Sex:  female  :  1995  MRN:  [id number]  Visit Number:  [id number]    Date of Admission:2020   Date of Discharge: 3/2/2020     Discharge Diagnosis:  Active Problems:    Schizophrenia, hebephrenic type (CMS/[facility])    Polysubstance dependence including opioid type drug with complication, episodic abuse (CMS/[facility])    Methamphetamine abuse (CMS/[facility])    Opiate abuse, continuous (CMS/[facility])      Admission Diagnosis:  Psychosis (CMS/[facility]) [F29]     Hospital Course  Patient is a 24 y.o. female presented with anxiety, psychosis, SI. She had been admitted to inpatient psychiatric facilities at least nine times prior for psychosis, substance abuse and other issues. She was started on aripiprazole, transitioned to COHEN formulation on 20, tolerated it well. She was maintained on Suboxone 4mg daily. She showed good improvement and was accepted to [facility]. She is due for her next Aristada injection on 2020.     On the day of discharge, patient denied SI, HI or AVH.  Patient showed improvement of presenting symptoms and was deemed appropriate for discharge today.    Mental Status Exam upon discharge:   Mood \"better\"   Affect-congruent, appropriate, stable  Thought Content-goal directed, no delusional material present  Thought process-linear, organized.  Suicidality: No SI  Homicidality: No HI  Perception: No AH/VH    Procedures Performed         Consults:   Consults     No orders found from 2020 to 2020.          Pertinent Test Results:   Lab Results (last 7 days)     Procedure Component Value Units Date/Time    Hepatitis Panel, Acute [[id number]] Collected:  20    Specimen:  Blood Updated:  20 09    Narrative:       The following orders were created for panel order Hepatitis Panel, Acute.  Procedure                               Abnormality         Status                   "   ---------                               -----------         ------                     Hepatitis Panel, Acute[[id number]]       Normal              Final result               Hep B Confirmation Tube[[id number]]                          Final result                 Please view results for these tests on the individual orders.    Hep B Confirmation Tube [[id number]] Collected:  02/25/20 2053    Specimen:  Blood Updated:  02/26/20 0900     Extra Tube Hold for add-ons.     Comment: Auto resulted.       HIV-1 / O / 2 Ag / Antibody 4th Generation [[id number]]  (Normal) Collected:  02/25/20 2053    Specimen:  Blood Updated:  02/25/20 2147     HIV-1/ HIV-2 Non-Reactive     Comment: A non-reactive test result does not preclude the possibility of exposure to HIV or infection with HIV. An antibody response to recent exposure may take several months to reach detectable levels.       Narrative:       The HIV antibody/antigen combo assay is a qualitative assay for HIV that includes the p24 antigen as well as antibodies to HIV types 1 and 2. This test is intended to be used as a screening assay in the diagnosis of HIV infection in patients over the age of 2.  Results may be falsely decreased if patient taking Biotin.      Hepatitis Panel, Acute [[id number]]  (Normal) Collected:  02/25/20 2053    Specimen:  Blood Updated:  02/25/20 2137     Hepatitis B Surface Ag Non-Reactive     Hep A IgM Non-Reactive     Hep B C IgM Non-Reactive     Hepatitis C Ab Non-Reactive    Narrative:       Results may be falsely decreased if patient taking Biotin.           Condition on Discharge:  improved    Vital Signs       Discharge Disposition:  Rehab Facility or Unit (DC - External)    Discharge Medications:     Discharge Medications      New Medications      Instructions Start Date   ARIPiprazole Lauroxil  MG/2.4ML intramuscular injection  Commonly known as:  ARISTADA   662 mg, Intramuscular, Once   Start Date:  March 27, 2020      buprenorphine-naloxone 8-2 MG per SL tablet  Commonly known as:  SUBOXONE   0.5 tablets, Sublingual, Daily      lubrisoft lotion lotion   Apply externally, Every 1 Hour PRN      traZODone 100 MG tablet  Commonly known as:  DESYREL   100 mg, Oral, Nightly         Stop These Medications    acetaminophen 500 MG tablet  Commonly known as:  TYLENOL     aluminum-magnesium hydroxide-simethicone 200-200-20 MG/5ML suspension  Commonly known as:  MAALOX/MYLANTA     busPIRone 10 MG tablet  Commonly known as:  BUSPAR     calcium carbonate 500 MG chewable tablet  Commonly known as:  TUMS     cyclobenzaprine 10 MG tablet  Commonly known as:  FLEXERIL     doxepin 50 MG capsule  Commonly known as:  SINEquan     hydrOXYzine pamoate 25 MG capsule  Commonly known as:  VISTARIL     ondansetron ODT 4 MG disintegrating tablet  Commonly known as:  ZOFRAN-ODT            Discharge Diet: Normal  Diet Instructions     AS TOLERATED                 Activity at Discharge: Normal  Activity Instructions     AS TOLERATED                 Follow-up Appointments  No future appointments.      Test Results Pending at Discharge  None     Clinician:   Gt Salgado MD  03/07/20  6:41 PM

## 2020-04-02 ENCOUNTER — HOSPITAL ENCOUNTER (EMERGENCY)
Facility: HOSPITAL | Age: 25
Discharge: STILL ADMITTED OR ADMINISTRATIVE | End: 2020-04-02
Attending: FAMILY MEDICINE

## 2020-04-02 ENCOUNTER — HOSPITAL ENCOUNTER (INPATIENT)
Facility: HOSPITAL | Age: 25
LOS: 4 days | Discharge: HOME | End: 2020-04-06
Attending: PSYCHIATRY & NEUROLOGY | Admitting: PSYCHIATRY & NEUROLOGY

## 2020-04-02 VITALS
RESPIRATION RATE: 16 BRPM | BODY MASS INDEX: 17.75 KG/M2 | WEIGHT: 104 LBS | HEIGHT: 64 IN | OXYGEN SATURATION: 100 % | DIASTOLIC BLOOD PRESSURE: 78 MMHG | HEART RATE: 65 BPM | TEMPERATURE: 97.6 F | SYSTOLIC BLOOD PRESSURE: 116 MMHG

## 2020-04-02 DIAGNOSIS — R45.851 SUICIDAL IDEATION: Primary | ICD-10-CM

## 2020-04-02 PROBLEM — F32.9 MDD (MAJOR DEPRESSIVE DISORDER): Status: ACTIVE | Noted: 2020-04-02

## 2020-04-02 LAB
6-ACETYL MORPHINE: NEGATIVE
ALBUMIN SERPL-MCNC: 4.69 G/DL (ref 3.5–5.2)
ALBUMIN/GLOB SERPL: 1.7 G/DL
ALP SERPL-CCNC: 64 U/L (ref 39–117)
ALT SERPL W P-5'-P-CCNC: 6 U/L (ref 1–33)
AMPHET+METHAMPHET UR QL: POSITIVE
ANION GAP SERPL CALCULATED.3IONS-SCNC: 12.3 MMOL/L (ref 5–15)
APAP SERPL-MCNC: <5 MCG/ML (ref 10–30)
AST SERPL-CCNC: 10 U/L (ref 1–32)
BARBITURATES UR QL SCN: NEGATIVE
BASOPHILS # BLD AUTO: 0.07 10*3/MM3 (ref 0–0.2)
BASOPHILS NFR BLD AUTO: 0.6 % (ref 0–1.5)
BENZODIAZ UR QL SCN: NEGATIVE
BILIRUB SERPL-MCNC: 0.2 MG/DL (ref 0.2–1.2)
BILIRUB UR QL STRIP: NEGATIVE
BUN BLD-MCNC: 6 MG/DL (ref 6–20)
BUN/CREAT SERPL: 8.8 (ref 7–25)
BUPRENORPHINE SERPL-MCNC: POSITIVE NG/ML
CALCIUM SPEC-SCNC: 9.3 MG/DL (ref 8.6–10.5)
CANNABINOIDS SERPL QL: NEGATIVE
CHLORIDE SERPL-SCNC: 105 MMOL/L (ref 98–107)
CLARITY UR: CLEAR
CO2 SERPL-SCNC: 22.7 MMOL/L (ref 22–29)
COCAINE UR QL: NEGATIVE
COLOR UR: YELLOW
CREAT BLD-MCNC: 0.68 MG/DL (ref 0.57–1)
DEPRECATED RDW RBC AUTO: 40.9 FL (ref 37–54)
EOSINOPHIL # BLD AUTO: 0.11 10*3/MM3 (ref 0–0.4)
EOSINOPHIL NFR BLD AUTO: 1 % (ref 0.3–6.2)
ERYTHROCYTE [DISTWIDTH] IN BLOOD BY AUTOMATED COUNT: 12.6 % (ref 12.3–15.4)
ETHANOL BLD-MCNC: <10 MG/DL (ref 0–10)
ETHANOL UR QL: <0.01 %
GFR SERPL CREATININE-BSD FRML MDRD: 104 ML/MIN/1.73
GLOBULIN UR ELPH-MCNC: 2.7 GM/DL
GLUCOSE BLD-MCNC: 95 MG/DL (ref 65–99)
GLUCOSE UR STRIP-MCNC: NEGATIVE MG/DL
HCG SERPL QL: NEGATIVE
HCT VFR BLD AUTO: 34.7 % (ref 34–46.6)
HGB BLD-MCNC: 11.3 G/DL (ref 12–15.9)
HGB UR QL STRIP.AUTO: NEGATIVE
IMM GRANULOCYTES # BLD AUTO: 0.03 10*3/MM3 (ref 0–0.05)
IMM GRANULOCYTES NFR BLD AUTO: 0.3 % (ref 0–0.5)
KETONES UR QL STRIP: NEGATIVE
LEUKOCYTE ESTERASE UR QL STRIP.AUTO: NEGATIVE
LYMPHOCYTES # BLD AUTO: 3.09 10*3/MM3 (ref 0.7–3.1)
LYMPHOCYTES NFR BLD AUTO: 28 % (ref 19.6–45.3)
MCH RBC QN AUTO: 29.3 PG (ref 26.6–33)
MCHC RBC AUTO-ENTMCNC: 32.6 G/DL (ref 31.5–35.7)
MCV RBC AUTO: 89.9 FL (ref 79–97)
METHADONE UR QL SCN: NEGATIVE
MONOCYTES # BLD AUTO: 0.69 10*3/MM3 (ref 0.1–0.9)
MONOCYTES NFR BLD AUTO: 6.2 % (ref 5–12)
NEUTROPHILS # BLD AUTO: 7.06 10*3/MM3 (ref 1.7–7)
NEUTROPHILS NFR BLD AUTO: 63.9 % (ref 42.7–76)
NITRITE UR QL STRIP: NEGATIVE
NRBC BLD AUTO-RTO: 0 /100 WBC (ref 0–0.2)
OPIATES UR QL: NEGATIVE
OXYCODONE UR QL SCN: NEGATIVE
PCP UR QL SCN: NEGATIVE
PH UR STRIP.AUTO: 7.5 [PH] (ref 5–8)
PLATELET # BLD AUTO: 343 10*3/MM3 (ref 140–450)
PMV BLD AUTO: 10 FL (ref 6–12)
POTASSIUM BLD-SCNC: 3.5 MMOL/L (ref 3.5–5.2)
PROT SERPL-MCNC: 7.4 G/DL (ref 6–8.5)
PROT UR QL STRIP: NEGATIVE
RBC # BLD AUTO: 3.86 10*6/MM3 (ref 3.77–5.28)
SALICYLATES SERPL-MCNC: 0.4 MG/DL
SODIUM BLD-SCNC: 140 MMOL/L (ref 136–145)
SP GR UR STRIP: 1.01 (ref 1–1.03)
UROBILINOGEN UR QL STRIP: NORMAL
WBC NRBC COR # BLD: 11.05 10*3/MM3 (ref 3.4–10.8)

## 2020-04-02 PROCEDURE — 80307 DRUG TEST PRSMV CHEM ANLYZR: CPT | Performed by: FAMILY MEDICINE

## 2020-04-02 PROCEDURE — 93005 ELECTROCARDIOGRAM TRACING: CPT | Performed by: PSYCHIATRY & NEUROLOGY

## 2020-04-02 PROCEDURE — 81003 URINALYSIS AUTO W/O SCOPE: CPT | Performed by: FAMILY MEDICINE

## 2020-04-02 PROCEDURE — 80053 COMPREHEN METABOLIC PANEL: CPT | Performed by: FAMILY MEDICINE

## 2020-04-02 PROCEDURE — 85025 COMPLETE CBC W/AUTO DIFF WBC: CPT | Performed by: FAMILY MEDICINE

## 2020-04-02 PROCEDURE — 99284 EMERGENCY DEPT VISIT MOD MDM: CPT

## 2020-04-02 PROCEDURE — 84703 CHORIONIC GONADOTROPIN ASSAY: CPT | Performed by: FAMILY MEDICINE

## 2020-04-02 RX ORDER — TRAZODONE HYDROCHLORIDE 50 MG/1
100 TABLET ORAL NIGHTLY
Status: CANCELLED | OUTPATIENT
Start: 2020-04-02

## 2020-04-02 RX ORDER — BENZTROPINE MESYLATE 1 MG/ML
1 INJECTION INTRAMUSCULAR; INTRAVENOUS ONCE AS NEEDED
Status: DISCONTINUED | OUTPATIENT
Start: 2020-04-02 | End: 2020-04-03

## 2020-04-02 RX ORDER — CLONIDINE HYDROCHLORIDE 0.1 MG/1
0.1 TABLET ORAL 4 TIMES DAILY PRN
Status: ACTIVE | OUTPATIENT
Start: 2020-04-03 | End: 2020-04-04

## 2020-04-02 RX ORDER — CLONIDINE HYDROCHLORIDE 0.1 MG/1
0.1 TABLET ORAL 4 TIMES DAILY PRN
Status: ACTIVE | OUTPATIENT
Start: 2020-04-02 | End: 2020-04-03

## 2020-04-02 RX ORDER — TRAZODONE HYDROCHLORIDE 50 MG/1
50 TABLET ORAL NIGHTLY PRN
Status: DISCONTINUED | OUTPATIENT
Start: 2020-04-02 | End: 2020-04-03

## 2020-04-02 RX ORDER — ONDANSETRON 4 MG/1
4 TABLET, FILM COATED ORAL EVERY 6 HOURS PRN
Status: DISCONTINUED | OUTPATIENT
Start: 2020-04-02 | End: 2020-04-06 | Stop reason: HOSPADM

## 2020-04-02 RX ORDER — NICOTINE 21 MG/24HR
1 PATCH, TRANSDERMAL 24 HOURS TRANSDERMAL
Status: DISCONTINUED | OUTPATIENT
Start: 2020-04-03 | End: 2020-04-06 | Stop reason: HOSPADM

## 2020-04-02 RX ORDER — TRAZODONE HYDROCHLORIDE 100 MG/1
100 TABLET ORAL NIGHTLY
COMMUNITY
End: 2020-04-06 | Stop reason: HOSPADM

## 2020-04-02 RX ORDER — ALUMINA, MAGNESIA, AND SIMETHICONE 2400; 2400; 240 MG/30ML; MG/30ML; MG/30ML
15 SUSPENSION ORAL EVERY 6 HOURS PRN
Status: DISCONTINUED | OUTPATIENT
Start: 2020-04-02 | End: 2020-04-06 | Stop reason: HOSPADM

## 2020-04-02 RX ORDER — FAMOTIDINE 20 MG/1
20 TABLET, FILM COATED ORAL 2 TIMES DAILY PRN
Status: DISCONTINUED | OUTPATIENT
Start: 2020-04-02 | End: 2020-04-06 | Stop reason: HOSPADM

## 2020-04-02 RX ORDER — FLUTICASONE PROPIONATE 50 MCG
1 SPRAY, SUSPENSION (ML) NASAL 2 TIMES DAILY
Status: CANCELLED | OUTPATIENT
Start: 2020-04-02

## 2020-04-02 RX ORDER — BENZONATATE 100 MG/1
100 CAPSULE ORAL 3 TIMES DAILY PRN
Status: DISCONTINUED | OUTPATIENT
Start: 2020-04-02 | End: 2020-04-03

## 2020-04-02 RX ORDER — CLONIDINE HYDROCHLORIDE 0.1 MG/1
0.1 TABLET ORAL 3 TIMES DAILY PRN
Status: ACTIVE | OUTPATIENT
Start: 2020-04-04 | End: 2020-04-05

## 2020-04-02 RX ORDER — ECHINACEA PURPUREA EXTRACT 125 MG
2 TABLET ORAL AS NEEDED
Status: DISCONTINUED | OUTPATIENT
Start: 2020-04-02 | End: 2020-04-06 | Stop reason: HOSPADM

## 2020-04-02 RX ORDER — CLONIDINE HYDROCHLORIDE 0.1 MG/1
0.1 TABLET ORAL 2 TIMES DAILY PRN
Status: ACTIVE | OUTPATIENT
Start: 2020-04-05 | End: 2020-04-06

## 2020-04-02 RX ORDER — BENZTROPINE MESYLATE 1 MG/1
2 TABLET ORAL ONCE AS NEEDED
Status: DISCONTINUED | OUTPATIENT
Start: 2020-04-02 | End: 2020-04-03

## 2020-04-02 RX ORDER — LOPERAMIDE HYDROCHLORIDE 2 MG/1
2 CAPSULE ORAL
Status: DISCONTINUED | OUTPATIENT
Start: 2020-04-02 | End: 2020-04-06 | Stop reason: HOSPADM

## 2020-04-02 RX ORDER — FLUTICASONE PROPIONATE 50 MCG
1 SPRAY, SUSPENSION (ML) NASAL 2 TIMES DAILY
Status: ON HOLD | COMMUNITY
End: 2020-04-06 | Stop reason: SDUPTHER

## 2020-04-02 RX ORDER — CLONIDINE HYDROCHLORIDE 0.1 MG/1
0.1 TABLET ORAL DAILY PRN
Status: DISCONTINUED | OUTPATIENT
Start: 2020-04-06 | End: 2020-04-06 | Stop reason: HOSPADM

## 2020-04-02 RX ORDER — CYCLOBENZAPRINE HCL 10 MG
10 TABLET ORAL 3 TIMES DAILY PRN
Status: DISCONTINUED | OUTPATIENT
Start: 2020-04-02 | End: 2020-04-06 | Stop reason: HOSPADM

## 2020-04-02 RX ORDER — DICYCLOMINE HYDROCHLORIDE 10 MG/1
10 CAPSULE ORAL 3 TIMES DAILY PRN
Status: DISCONTINUED | OUTPATIENT
Start: 2020-04-02 | End: 2020-04-06 | Stop reason: HOSPADM

## 2020-04-02 RX ORDER — ACETAMINOPHEN 325 MG/1
650 TABLET ORAL EVERY 6 HOURS PRN
Status: DISCONTINUED | OUTPATIENT
Start: 2020-04-02 | End: 2020-04-06 | Stop reason: HOSPADM

## 2020-04-02 RX ORDER — HYDROXYZINE 50 MG/1
50 TABLET, FILM COATED ORAL EVERY 6 HOURS PRN
Status: DISCONTINUED | OUTPATIENT
Start: 2020-04-02 | End: 2020-04-06 | Stop reason: HOSPADM

## 2020-04-03 PROBLEM — F33.8 OTHER RECURRENT DEPRESSIVE DISORDERS (HCC): Status: ACTIVE | Noted: 2020-04-03

## 2020-04-03 PROCEDURE — 99223 1ST HOSP IP/OBS HIGH 75: CPT | Performed by: PSYCHIATRY & NEUROLOGY

## 2020-04-03 PROCEDURE — 93010 ELECTROCARDIOGRAM REPORT: CPT | Performed by: SPECIALIST

## 2020-04-03 RX ORDER — FLUTICASONE PROPIONATE 50 MCG
1 SPRAY, SUSPENSION (ML) NASAL 2 TIMES DAILY
Status: DISCONTINUED | OUTPATIENT
Start: 2020-04-03 | End: 2020-04-06 | Stop reason: HOSPADM

## 2020-04-03 RX ORDER — TRAZODONE HYDROCHLORIDE 50 MG/1
100 TABLET ORAL NIGHTLY
Status: CANCELLED | OUTPATIENT
Start: 2020-04-03

## 2020-04-03 RX ORDER — MIRTAZAPINE 15 MG/1
15 TABLET, FILM COATED ORAL NIGHTLY
Status: DISCONTINUED | OUTPATIENT
Start: 2020-04-03 | End: 2020-04-06 | Stop reason: HOSPADM

## 2020-04-03 RX ORDER — LURASIDONE HYDROCHLORIDE 40 MG/1
40 TABLET, FILM COATED ORAL DAILY
Status: CANCELLED | OUTPATIENT
Start: 2020-04-03

## 2020-04-03 RX ORDER — OLANZAPINE 5 MG/1
5 TABLET, ORALLY DISINTEGRATING ORAL 3 TIMES DAILY PRN
Status: DISCONTINUED | OUTPATIENT
Start: 2020-04-03 | End: 2020-04-06 | Stop reason: HOSPADM

## 2020-04-03 RX ORDER — FLUTICASONE PROPIONATE 50 MCG
1 SPRAY, SUSPENSION (ML) NASAL 2 TIMES DAILY
Status: CANCELLED | OUTPATIENT
Start: 2020-04-03

## 2020-04-03 RX ORDER — LURASIDONE HYDROCHLORIDE 40 MG/1
40 TABLET, FILM COATED ORAL DAILY
Status: ON HOLD | COMMUNITY
End: 2020-04-06 | Stop reason: SDUPTHER

## 2020-04-03 RX ORDER — OLANZAPINE 5 MG/1
5 TABLET ORAL 2 TIMES DAILY
Status: DISCONTINUED | OUTPATIENT
Start: 2020-04-03 | End: 2020-04-06 | Stop reason: HOSPADM

## 2020-04-03 RX ADMIN — ACETAMINOPHEN 650 MG: 325 TABLET ORAL at 00:54

## 2020-04-03 RX ADMIN — OLANZAPINE 5 MG: 5 TABLET ORAL at 20:46

## 2020-04-03 RX ADMIN — OLANZAPINE 5 MG: 5 TABLET ORAL at 13:17

## 2020-04-03 RX ADMIN — HYDROXYZINE HYDROCHLORIDE 50 MG: 50 TABLET ORAL at 20:47

## 2020-04-03 RX ADMIN — DICYCLOMINE HYDROCHLORIDE 10 MG: 10 CAPSULE ORAL at 09:33

## 2020-04-03 RX ADMIN — FLUTICASONE PROPIONATE 1 SPRAY: 50 SPRAY, METERED NASAL at 12:13

## 2020-04-03 RX ADMIN — ACETAMINOPHEN 650 MG: 325 TABLET ORAL at 13:55

## 2020-04-03 RX ADMIN — NICOTINE 1 PATCH: 21 PATCH TRANSDERMAL at 08:18

## 2020-04-03 RX ADMIN — MIRTAZAPINE 15 MG: 15 TABLET, FILM COATED ORAL at 20:46

## 2020-04-03 RX ADMIN — FLUTICASONE PROPIONATE 1 SPRAY: 50 SPRAY, METERED NASAL at 20:46

## 2020-04-03 RX ADMIN — ACETAMINOPHEN 650 MG: 325 TABLET ORAL at 08:18

## 2020-04-03 RX ADMIN — ACETAMINOPHEN 650 MG: 325 TABLET ORAL at 20:47

## 2020-04-03 NOTE — PLAN OF CARE
Problem: Patient Care Overview  Goal: Plan of Care Review  Outcome: Ongoing (interventions implemented as appropriate)  Flowsheets (Taken 4/3/2020 8603)  Progress: improving  Plan of Care Reviewed With: patient  Patient Agreement with Plan of Care: agrees  Note:   Patient really anxious today, somatic, asking for stuff frequently.  Wanting anything she can have on MAR/PRN's
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
## 2020-04-03 NOTE — NURSING NOTE
"Patient is seeking help for suicidal thoughts she reports thoughts of cutting her wrist or her throat. She reports she is tired of not getting the help she needs, she is interesting in getting Klonopin, \"it's the only medication that helps with my anxiety\", \"I use meth because it helps me to think and stops the racing thoughts\". Patient reports use meth 4 days ago and suboxone 4 days ago. She reports she had been [facility] x 8 days for suicidal thoughts and was released on Monday, 3/30/20. She reports Dr Sena changed several of her medications. She reports that she was due an Abilify shot on 3/27/20. She reports a charge for PI last month but it has been taken care of, she missed her court date while she was in rehab. She is currently living with her 67 year old fikarthike. She reports they are more like roommates. Stating she is going to  him so she can have his home, car and benefits when he dies. She reports having nightmares and hearing scary voices, only at night. She reports hx of cutting 5 years ago and hx of suicide attempt by cutting her wrist at age 20. Anxiety rated 8/10, depression rated 7/10 and craving rated 10/10.  "

## 2020-04-03 NOTE — PLAN OF CARE
Problem: Pain, Chronic (Adult)  Goal: Identify Related Risk Factors and Signs and Symptoms  Outcome: Outcome(s) achieved  Flowsheets (Taken 4/3/2020 0003)  Related Risk Factors (Chronic Pain): disease process  Signs and Symptoms (Chronic Pain): verbalization of pain descriptors; verbalization of pain/discomfort for a prolonged time period  Note:   Report dx of scoliosis at age 11, problems with chronic pain in back at age 20

## 2020-04-03 NOTE — NURSING NOTE
Phone contact with Dr Salgado. Presented clinicals/assess presented. Orders received. Admit to A/E, Routine orders. S/P 3. Clonidine detox admission day today. Orders read back and verified. Pt advised of admission. Requested to notify her significant other, which was done.

## 2020-04-03 NOTE — NURSING NOTE
Pt presents to intake stating she was seen by [facility] today and they advised she come for her suicidal thoughts. States her mind is racing, having reoccurring auditory hallucinations. Reports released from [facility] 4 days ago after 8 day stay. Prior to that, she had been in recovery works in [place] but left there because they did not give her other psy meds. Pt has had multiple psy and detox admissions.  Pt searched per 2 staff, placed in hosp scrubs, belongings placed in cabinet. Pt with staff for assess.

## 2020-04-03 NOTE — DISCHARGE INSTR - APPOINTMENTS
Vickey   [address] [place] [id number]  [phone]    April 9 2020 at 2:00pm with Clarita Otto will call you for a telehealth appointment. You will not be going into the office.

## 2020-04-03 NOTE — ED PROVIDER NOTES
Subjective   25-year-old female with a history of schizoaffective disorder substance abuse presents the emergency room with complaints of suicidal ideation.  Patient reports that she admitted to psychiatric intake and discharged on March 2.  Patient then followed up in recovery works but due to inability to receive her medications of Suboxone Klonopin she presented to [facility].  At [facility] patient was admitted for psychiatric intake.  She was discharged 4 days ago.  She followed up with her psychiatrist and had informed her of her thoughts of wanting to harm herself.  She was directed to come to the emergency room for further evaluation.  Patient states she has had thoughts of wanting to harm her self.  She has attempted to harm self in the past with cutting her wrist.  She denies illicit drug use.  She denies alcohol use.      Mental Health Problem   Presenting symptoms: suicidal thoughts    Duration:  4 days  Timing:  Intermittent  Chronicity:  Recurrent  Worsened by:  Nothing  Associated symptoms: no abdominal pain, no chest pain, no fatigue, no headaches, no hyperventilation, no irritability and no weight change        Review of Systems   Constitutional: Negative for fatigue and irritability.   Respiratory: Negative for cough and shortness of breath.    Cardiovascular: Negative for chest pain.   Gastrointestinal: Negative for abdominal pain, diarrhea, nausea and vomiting.   Musculoskeletal: Negative for back pain, gait problem and myalgias.   Skin: Negative for rash and wound.   Neurological: Negative for weakness and headaches.   Psychiatric/Behavioral: Positive for suicidal ideas.   All other systems reviewed and are negative.      Past Medical History:   Diagnosis Date   • Anxiety    • Depression    • Psychosis (CMS/[facility])    • Schizoaffective disorder (CMS/[facility])    • Scoliosis    • Substance abuse (CMS/[facility])    • Suicide attempt (CMS/[facility])     2019 cut wrist   • Withdrawal  symptoms, drug or narcotic (CMS/[facility])        Allergies   Allergen Reactions   • Codeine Itching   • Ibuprofen GI Intolerance   • Penicillins Hives   • Toradol [Ketorolac Tromethamine] Cough       Past Surgical History:   Procedure Laterality Date   • NO PAST SURGERIES         Family History   Problem Relation Age of Onset   • Suicide Attempts Maternal Uncle    • Suicide Attempts Father        Social History     Socioeconomic History   • Marital status: Single     Spouse name: Not on file   • Number of children: Not on file   • Years of education: Not on file   • Highest education level: Not on file   Tobacco Use   • Smoking status: Current Every Day Smoker     Packs/day: 1.50     Types: Cigarettes   • Smokeless tobacco: Never Used   Substance and Sexual Activity   • Alcohol use: Not Currently   • Drug use: Not Currently   • Sexual activity: Not Currently     Partners: Male           Objective   Physical Exam   Constitutional: She is oriented to person, place, and time. She appears well-developed and well-nourished. No distress.   HENT:   Head: Normocephalic and atraumatic.   Mouth/Throat: Oropharynx is clear and moist.   Eyes: Pupils are equal, round, and reactive to light. EOM are normal. No scleral icterus.   Neck: Neck supple.   Cardiovascular: Normal rate, regular rhythm and normal heart sounds. Exam reveals no friction rub.   No murmur heard.  Pulmonary/Chest: Effort normal and breath sounds normal. No respiratory distress. She has no wheezes. She has no rales.   Abdominal: Soft. Bowel sounds are normal. There is no tenderness. There is no rebound and no guarding.   Musculoskeletal: Normal range of motion. She exhibits no edema.   Neurological: She is alert and oriented to person, place, and time. No cranial nerve deficit.   Skin: Skin is warm and dry. No rash noted.   Psychiatric:   Depressed mood.  No active hallucinations.   Nursing note and vitals reviewed.      Procedures           ED Course  ED Course as  of Apr 02 2131   Thu Apr 02, 2020 2056 hCG negative urinalysis unremarkable    [BB]   2103 Patient is medically clear for psychiatric evaluation    [BB]   2129 Have spoken to Dr. Salgado who is agreeable to admit to hospital    [BB]      ED Course User Index  [BB] Jaret Song MD                                           [facility]    Final diagnoses:   Suicidal ideation            Jaret Song MD  04/02/20 2131
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
## 2020-04-04 PROCEDURE — 99233 SBSQ HOSP IP/OBS HIGH 50: CPT | Performed by: PSYCHIATRY & NEUROLOGY

## 2020-04-04 PROCEDURE — 63710000001 ONDANSETRON PER 8 MG: Performed by: PSYCHIATRY & NEUROLOGY

## 2020-04-04 RX ORDER — TRAZODONE HYDROCHLORIDE 50 MG/1
100 TABLET ORAL NIGHTLY
Status: DISCONTINUED | OUTPATIENT
Start: 2020-04-04 | End: 2020-04-06 | Stop reason: HOSPADM

## 2020-04-04 RX ADMIN — FLUTICASONE PROPIONATE 1 SPRAY: 50 SPRAY, METERED NASAL at 21:07

## 2020-04-04 RX ADMIN — NICOTINE 1 PATCH: 21 PATCH TRANSDERMAL at 09:16

## 2020-04-04 RX ADMIN — TRAZODONE HYDROCHLORIDE 100 MG: 50 TABLET ORAL at 21:07

## 2020-04-04 RX ADMIN — SERTRALINE 50 MG: 50 TABLET, FILM COATED ORAL at 11:25

## 2020-04-04 RX ADMIN — ACETAMINOPHEN 650 MG: 325 TABLET ORAL at 09:16

## 2020-04-04 RX ADMIN — MIRTAZAPINE 15 MG: 15 TABLET, FILM COATED ORAL at 21:07

## 2020-04-04 RX ADMIN — FLUTICASONE PROPIONATE 1 SPRAY: 50 SPRAY, METERED NASAL at 09:16

## 2020-04-04 RX ADMIN — HYDROXYZINE HYDROCHLORIDE 50 MG: 50 TABLET ORAL at 09:16

## 2020-04-04 RX ADMIN — ONDANSETRON HYDROCHLORIDE 4 MG: 4 TABLET, FILM COATED ORAL at 17:09

## 2020-04-04 RX ADMIN — DICYCLOMINE HYDROCHLORIDE 10 MG: 10 CAPSULE ORAL at 09:18

## 2020-04-04 RX ADMIN — OLANZAPINE 5 MG: 5 TABLET ORAL at 21:07

## 2020-04-04 RX ADMIN — OLANZAPINE 5 MG: 5 TABLET ORAL at 09:16

## 2020-04-04 RX ADMIN — ACETAMINOPHEN 650 MG: 325 TABLET ORAL at 18:21

## 2020-04-04 NOTE — PROGRESS NOTES
Subjective   Katie Lindsey is a 25 y.o. female who presents today for hospital follow up    Chief Complaint: Depression with suicidal ideation    History of Present Illness: Patient is a 25-year-old  female who presented with depression and suicidal ideation.  I evaluated her today.  She had no complaints.  She denied any major issues with mood or anxiety.  She denied SI/HI/AVH.  She denies medication side effects.    The following portions of the patient's history were reviewed and updated as appropriate: allergies, current medications, past family history, past medical history, past social history, past surgical history and problem list.      Past Medical History:  Past Medical History:   Diagnosis Date   • Anxiety    • Chronic pain disorder    • Depression    • Psychosis (CMS/HCC)    • Schizoaffective disorder (CMS/HCC)    • Scoliosis    • Substance abuse (CMS/HCC)    • Suicidal thoughts    • Suicide attempt (CMS/HCC)     cut wrist at 20 years old   • Withdrawal symptoms, drug or narcotic (CMS/HCC)        Social History:  Social History     Socioeconomic History   • Marital status: Single     Spouse name: Not on file   • Number of children: Not on file   • Years of education: Not on file   • Highest education level: Not on file   Tobacco Use   • Smoking status: Current Every Day Smoker     Packs/day: 1.50     Years: 11.00     Pack years: 16.50     Types: Cigarettes   • Smokeless tobacco: Never Used   • Tobacco comment: started smoking at 14 years old   Substance and Sexual Activity   • Alcohol use: Not Currently   • Drug use: Not Currently   • Sexual activity: Not Currently     Partners: Male       Family History:  Family History   Problem Relation Age of Onset   • Suicide Attempts Maternal Uncle    • Suicide Attempts Father        Past Surgical History:  Past Surgical History:   Procedure Laterality Date   • NO PAST SURGERIES         Problem List:  Patient Active Problem List   Diagnosis   •  Schizophrenia, hebephrenic type (CMS/HCC)   • Polysubstance dependence including opioid type drug with complication, episodic abuse (CMS/HCC)   • Methamphetamine abuse (CMS/HCC)   • Opiate abuse, continuous (CMS/HCC)   • Other recurrent depressive disorders (CMS/[facility])       Allergy:   Allergies   Allergen Reactions   • Codeine Itching   • Ibuprofen GI Intolerance   • Penicillins Hives   • Toradol [Ketorolac Tromethamine] Cough        Current Medications:   Current Facility-Administered Medications   Medication Dose Route Frequency Provider Last Rate Last Dose   • acetaminophen (TYLENOL) tablet 650 mg  650 mg Oral Q6H PRN Gt Salgado MD   650 mg at 04/04/20 0916   • aluminum-magnesium hydroxide-simethicone (MAALOX MAX) 400-400-40 MG/5ML suspension 15 mL  15 mL Oral Q6H PRN Gt Salgado MD       • cloNIDine (CATAPRES) tablet 0.1 mg  0.1 mg Oral TID PRN Gt Salgado MD        Followed by   • [START ON 4/5/2020] cloNIDine (CATAPRES) tablet 0.1 mg  0.1 mg Oral BID PRN Gt Salgado MD        Followed by   • [START ON 4/6/2020] cloNIDine (CATAPRES) tablet 0.1 mg  0.1 mg Oral Daily PRN Gt Salgado MD       • cyclobenzaprine (FLEXERIL) tablet 10 mg  10 mg Oral TID PRN Gt Salgado MD       • dicyclomine (BENTYL) capsule 10 mg  10 mg Oral TID PRN Gt Salgado MD   10 mg at 04/04/20 0918   • famotidine (PEPCID) tablet 20 mg  20 mg Oral BID PRN Gt Salgado MD       • fluticasone (FLONASE) 50 MCG/ACT nasal spray 1 spray  1 spray Each Nare BID Augustine Arguello MD   1 spray at 04/04/20 0916   • hydrOXYzine (ATARAX) tablet 50 mg  50 mg Oral Q6H PRN Gt Salgado MD   50 mg at 04/04/20 0916   • loperamide (IMODIUM) capsule 2 mg  2 mg Oral Q2H PRN Gt Salgado MD       • magnesium hydroxide (MILK OF MAGNESIA) suspension 2400 mg/10mL 10 mL  10 mL Oral Daily PRN Gt Salgado MD       • mirtazapine (REMERON) tablet 15 mg  15 mg Oral Nightly Augustine Arguello MD   15 mg at  "04/03/20 2046   • nicotine (NICODERM CQ) 21 MG/24HR patch 1 patch  1 patch Transdermal Q24H Gt Salgado MD   1 patch at 04/04/20 0916   • OLANZapine (zyPREXA) tablet 5 mg  5 mg Oral BID Augustine Arguello MD   5 mg at 04/04/20 0916   • OLANZapine zydis (zyPREXA) disintegrating tablet 5 mg  5 mg Oral TID PRN Augustine Arguello MD       • ondansetron (ZOFRAN) tablet 4 mg  4 mg Oral Q6H PRN Gt Salgado MD       • sertraline (ZOLOFT) tablet 50 mg  50 mg Oral Daily Iggy Underwood MD   50 mg at 04/04/20 1125   • sodium chloride nasal spray 2 spray  2 spray Each Nare PRN Gt Salgado MD       • traZODone (DESYREL) tablet 100 mg  100 mg Oral Nightly Iggy Underwood MD           Review of Symptoms:    Review of Systems   Constitutional: Negative.    HENT: Negative.    Eyes: Negative.    Respiratory: Negative.    Cardiovascular: Negative.    Gastrointestinal: Negative.    Endocrine: Negative.    Genitourinary: Negative.    Musculoskeletal: Negative.    Skin: Negative.    Allergic/Immunologic: Negative.    Neurological: Negative.    Hematological: Negative.    Psychiatric/Behavioral: Negative.          Physical Exam:   Blood pressure 101/58, pulse 70, temperature 98.1 °F (36.7 °C), temperature source Temporal, resp. rate 16, height 162.6 cm (64\"), weight 45.6 kg (100 lb 9.6 oz), last menstrual period 03/01/2020, SpO2 99 %, not currently breastfeeding.    Appearance: Patient female of stated age in no acute distress  Gait, Station, Strength: Within normal limits    Mental Status Exam:   Hygiene:   good  Cooperation:  Cooperative  Eye Contact:  Poor  Psychomotor Behavior:  Appropriate  Affect:  Restricted  Mood: euthymic  Hopelessness: Denies  Speech:  Normal  Thought Process:  Goal directed and Tangential  Thought Content:  Bizarre and Mood congruent  Suicidal:  None  Homicidal:  None  Hallucinations:  Auditory and Not demonstrated today  Delusion:  None  Memory:  Intact  Orientation:  Person, " Place, Time and Situation  Reliability:  poor  Insight:  Poor  Judgement:  Poor  Impulse Control:  Poor  Physical/Medical Issues:  No        Lab Results:   Admission on 04/02/2020, Discharged on 04/02/2020   Component Date Value Ref Range Status   • Glucose 04/02/2020 95  65 - 99 mg/dL Final   • BUN 04/02/2020 6  6 - 20 mg/dL Final   • Creatinine 04/02/2020 0.68  0.57 - 1.00 mg/dL Final   • Sodium 04/02/2020 140  136 - 145 mmol/L Final   • Potassium 04/02/2020 3.5  3.5 - 5.2 mmol/L Final   • Chloride 04/02/2020 105  98 - 107 mmol/L Final   • CO2 04/02/2020 22.7  22.0 - 29.0 mmol/L Final   • Calcium 04/02/2020 9.3  8.6 - 10.5 mg/dL Final   • Total Protein 04/02/2020 7.4  6.0 - 8.5 g/dL Final   • Albumin 04/02/2020 4.69  3.50 - 5.20 g/dL Final   • ALT (SGPT) 04/02/2020 6  1 - 33 U/L Final   • AST (SGOT) 04/02/2020 10  1 - 32 U/L Final   • Alkaline Phosphatase 04/02/2020 64  39 - 117 U/L Final   • Total Bilirubin 04/02/2020 0.2  0.2 - 1.2 mg/dL Final   • eGFR Non African Amer 04/02/2020 104  >60 mL/min/1.73 Final   • Globulin 04/02/2020 2.7  gm/dL Final   • A/G Ratio 04/02/2020 1.7  g/dL Final   • BUN/Creatinine Ratio 04/02/2020 8.8  7.0 - 25.0 Final   • Anion Gap 04/02/2020 12.3  5.0 - 15.0 mmol/L Final   • HCG Qualitative 04/02/2020 Negative  Negative Final   • Color, UA 04/02/2020 Yellow  Yellow, Straw Final   • Appearance, UA 04/02/2020 Clear  Clear Final   • pH, UA 04/02/2020 7.5  5.0 - 8.0 Final   • Specific Gravity, UA 04/02/2020 1.013  1.005 - 1.030 Final   • Glucose, UA 04/02/2020 Negative  Negative Final   • Ketones, UA 04/02/2020 Negative  Negative Final   • Bilirubin, UA 04/02/2020 Negative  Negative Final   • Blood, UA 04/02/2020 Negative  Negative Final   • Protein, UA 04/02/2020 Negative  Negative Final   • Leuk Esterase, UA 04/02/2020 Negative  Negative Final   • Nitrite, UA 04/02/2020 Negative  Negative Final   • Urobilinogen, UA 04/02/2020 1.0 E.U./dL  0.2 - 1.0 E.U./dL Final   • Amphetamine Screen,  Urine 04/02/2020 Positive* Negative Final   • Barbiturates Screen, Urine 04/02/2020 Negative  Negative Final   • Benzodiazepine Screen, Urine 04/02/2020 Negative  Negative Final   • Cocaine Screen, Urine 04/02/2020 Negative  Negative Final   • Methadone Screen, Urine 04/02/2020 Negative  Negative Final   • Opiate Screen 04/02/2020 Negative  Negative Final   • Phencyclidine (PCP), Urine 04/02/2020 Negative  Negative Final   • THC, Screen, Urine 04/02/2020 Negative  Negative Final   • 6-ACETYL MORPHINE 04/02/2020 Negative  Negative Final   • Buprenorphine, Screen, Urine 04/02/2020 Positive* Negative Final   • Oxycodone Screen, Urine 04/02/2020 Negative  Negative Final   • Ethanol 04/02/2020 <10  0 - 10 mg/dL Final   • Ethanol % 04/02/2020 <0.010  % Final   • Acetaminophen 04/02/2020 <5.0* 10.0 - 30.0 mcg/mL Final   • Salicylate 04/02/2020 0.4  <=30.0 mg/dL Final   • WBC 04/02/2020 11.05* 3.40 - 10.80 10*3/mm3 Final   • RBC 04/02/2020 3.86  3.77 - 5.28 10*6/mm3 Final   • Hemoglobin 04/02/2020 11.3* 12.0 - 15.9 g/dL Final   • Hematocrit 04/02/2020 34.7  34.0 - 46.6 % Final   • MCV 04/02/2020 89.9  79.0 - 97.0 fL Final   • MCH 04/02/2020 29.3  26.6 - 33.0 pg Final   • MCHC 04/02/2020 32.6  31.5 - 35.7 g/dL Final   • RDW 04/02/2020 12.6  12.3 - 15.4 % Final   • RDW-SD 04/02/2020 40.9  37.0 - 54.0 fl Final   • MPV 04/02/2020 10.0  6.0 - 12.0 fL Final   • Platelets 04/02/2020 343  140 - 450 10*3/mm3 Final   • Neutrophil % 04/02/2020 63.9  42.7 - 76.0 % Final   • Lymphocyte % 04/02/2020 28.0  19.6 - 45.3 % Final   • Monocyte % 04/02/2020 6.2  5.0 - 12.0 % Final   • Eosinophil % 04/02/2020 1.0  0.3 - 6.2 % Final   • Basophil % 04/02/2020 0.6  0.0 - 1.5 % Final   • Immature Grans % 04/02/2020 0.3  0.0 - 0.5 % Final   • Neutrophils, Absolute 04/02/2020 7.06* 1.70 - 7.00 10*3/mm3 Final   • Lymphocytes, Absolute 04/02/2020 3.09  0.70 - 3.10 10*3/mm3 Final   • Monocytes, Absolute 04/02/2020 0.69  0.10 - 0.90 10*3/mm3 Final   •  Eosinophils, Absolute 04/02/2020 0.11  0.00 - 0.40 10*3/mm3 Final   • Basophils, Absolute 04/02/2020 0.07  0.00 - 0.20 10*3/mm3 Final   • Immature Grans, Absolute 04/02/2020 0.03  0.00 - 0.05 10*3/mm3 Final   • nRBC 04/02/2020 0.0  0.0 - 0.2 /100 WBC Final       Assessment/Plan     Other recurrent depressive disorders (CMS/HCC)  -Start mirtazapine 15 mg p.o. Nightly  -Continue home Zoloft 50 mg p.o. daily      Schizophrenia, hebephrenic type (CMS/[facility])  -Discontinue Abilify due to side effects  -Start Zyprexa 5 mg p.o. twice daily      Polysubstance dependence including opioid type drug with complication, episodic abuse (CMS/[facility])  -Encourage cessation  -Encourage to return to rehab      Methamphetamine abuse (CMS/[facility])  -Encourage cessation  -Encourage to return to rehab      Opiate abuse, continuous (CMS/[facility])  -Clonidine detox with COWS withdrawal checks   -Encourage cessation  -Encourage to return to rehab    Insomnia  -Trazodone 100mg PO QHS       The patient has been admitted for safety and stabilization.  Patient will be monitored for suicidality daily and maintained on special precautions .  The patient will have individual and group therapy with a master's level therapist. A master treatment plan will be developed and agreed upon by the patient and his/her treatment team.  The patient's estimated length of stay in the hospital is 5-7 days.       This document has been electronically signed by Iggy Underwood MD  April 4, 2020 15:51

## 2020-04-04 NOTE — PLAN OF CARE
Problem: Patient Care Overview  Goal: Plan of Care Review  Outcome: Ongoing (interventions implemented as appropriate)  Flowsheets  Taken 4/4/2020 1345  Progress: improving  Outcome Summary: Patient reports anxiety as 10; depression as 5; pt denies any SI, HI or hallucinations; will continue to monitor patient  Taken 4/4/2020 1108  Plan of Care Reviewed With: patient  Patient Agreement with Plan of Care: agrees

## 2020-04-04 NOTE — PLAN OF CARE
"  Problem: Patient Care Overview  Goal: Plan of Care Review  Outcome: Ongoing (interventions implemented as appropriate)  Flowsheets (Taken 4/4/2020 0421)  Progress: no change  Plan of Care Reviewed With: patient  Patient Agreement with Plan of Care: agrees  Outcome Summary: Pt rates anxiety8/10, denies SI, Hi. Reports cravings 10/10 and withdrawal sx of bodyaches, headaches, muscle cramps.Pt states her mind is \"racing all the time.\" Continues to present at nurses station frequently with somatic complaints. Has slept well this shift.     "

## 2020-04-05 PROCEDURE — 99233 SBSQ HOSP IP/OBS HIGH 50: CPT | Performed by: PSYCHIATRY & NEUROLOGY

## 2020-04-05 RX ADMIN — ACETAMINOPHEN 650 MG: 325 TABLET ORAL at 20:19

## 2020-04-05 RX ADMIN — MIRTAZAPINE 15 MG: 15 TABLET, FILM COATED ORAL at 20:18

## 2020-04-05 RX ADMIN — NICOTINE 1 PATCH: 21 PATCH TRANSDERMAL at 08:17

## 2020-04-05 RX ADMIN — OLANZAPINE 5 MG: 5 TABLET ORAL at 20:18

## 2020-04-05 RX ADMIN — TRAZODONE HYDROCHLORIDE 100 MG: 50 TABLET ORAL at 20:18

## 2020-04-05 RX ADMIN — DICYCLOMINE HYDROCHLORIDE 10 MG: 10 CAPSULE ORAL at 08:51

## 2020-04-05 RX ADMIN — SERTRALINE 50 MG: 50 TABLET, FILM COATED ORAL at 08:17

## 2020-04-05 RX ADMIN — FLUTICASONE PROPIONATE 1 SPRAY: 50 SPRAY, METERED NASAL at 20:18

## 2020-04-05 RX ADMIN — ACETAMINOPHEN 650 MG: 325 TABLET ORAL at 02:42

## 2020-04-05 RX ADMIN — HYDROXYZINE HYDROCHLORIDE 50 MG: 50 TABLET ORAL at 08:18

## 2020-04-05 RX ADMIN — ACETAMINOPHEN 650 MG: 325 TABLET ORAL at 12:39

## 2020-04-05 RX ADMIN — OLANZAPINE 5 MG: 5 TABLET ORAL at 08:17

## 2020-04-05 RX ADMIN — FLUTICASONE PROPIONATE 1 SPRAY: 50 SPRAY, METERED NASAL at 08:17

## 2020-04-05 RX ADMIN — HYDROXYZINE HYDROCHLORIDE 50 MG: 50 TABLET ORAL at 20:19

## 2020-04-05 NOTE — PLAN OF CARE
Problem: Patient Care Overview  Goal: Plan of Care Review  Outcome: Ongoing (interventions implemented as appropriate)  Flowsheets (Taken 4/5/2020 0331)  Progress: no change  Plan of Care Reviewed With: patient  Patient Agreement with Plan of Care: agrees  Note:   Pt rates anxiety 8/10, depression 5/10. Reports hearing voices that are saying they are going to steal her money and hurt her brother. Denies SI, HI.Has spent most of shift in her room with little interaction with others.

## 2020-04-05 NOTE — NURSING NOTE
Spoke with Dr Underwood, via telephone; reported changes in pt's condition. Dr. Underwood to evaluate pt first thing in am. Pt continues resting in bed at this time.

## 2020-04-05 NOTE — PROGRESS NOTES
Subjective   Katie Lindsey is a 25 y.o. female who presents today for hospital follow up    Chief Complaint: Depression with suicidal ideation    History of Present Illness: Patient is a 25-year-old  female who presented with depression and suicidal ideation.  Upon evaluation today patient had no major complaints.  She denied fever, chills, diaphoresis.  She denies any major issues with mood or anxiety.  She denied issues with sleep or appetite.  She denied SI/HI/AVH.  She denies medication side effects.    She was noted to have a slightly elevated temperature at 99.9 and some redness in the tonsillar region overnight.  Patient denies sore throat or submandibular swelling.  Continue to monitor    The following portions of the patient's history were reviewed and updated as appropriate: allergies, current medications, past family history, past medical history, past social history, past surgical history and problem list.      Past Medical History:  Past Medical History:   Diagnosis Date   • Anxiety    • Chronic pain disorder    • Depression    • Psychosis (CMS/HCC)    • Schizoaffective disorder (CMS/HCC)    • Scoliosis    • Substance abuse (CMS/HCC)    • Suicidal thoughts    • Suicide attempt (CMS/HCC)     cut wrist at 20 years old   • Withdrawal symptoms, drug or narcotic (CMS/HCC)        Social History:  Social History     Socioeconomic History   • Marital status: Single     Spouse name: Not on file   • Number of children: Not on file   • Years of education: Not on file   • Highest education level: Not on file   Tobacco Use   • Smoking status: Current Every Day Smoker     Packs/day: 1.50     Years: 11.00     Pack years: 16.50     Types: Cigarettes   • Smokeless tobacco: Never Used   • Tobacco comment: started smoking at 14 years old   Substance and Sexual Activity   • Alcohol use: Not Currently   • Drug use: Not Currently   • Sexual activity: Not Currently     Partners: Male       Family History:  Family  History   Problem Relation Age of Onset   • Suicide Attempts Maternal Uncle    • Suicide Attempts Father        Past Surgical History:  Past Surgical History:   Procedure Laterality Date   • NO PAST SURGERIES         Problem List:  Patient Active Problem List   Diagnosis   • Schizophrenia, hebephrenic type (CMS/HCC)   • Polysubstance dependence including opioid type drug with complication, episodic abuse (CMS/HCC)   • Methamphetamine abuse (CMS/HCC)   • Opiate abuse, continuous (CMS/[facility])   • Other recurrent depressive disorders (CMS/[facility])       Allergy:   Allergies   Allergen Reactions   • Codeine Itching   • Ibuprofen GI Intolerance   • Penicillins Hives   • Toradol [Ketorolac Tromethamine] Cough        Current Medications:   Current Facility-Administered Medications   Medication Dose Route Frequency Provider Last Rate Last Dose   • acetaminophen (TYLENOL) tablet 650 mg  650 mg Oral Q6H PRN Gt Salgado MD   650 mg at 04/05/20 1239   • aluminum-magnesium hydroxide-simethicone (MAALOX MAX) 400-400-40 MG/5ML suspension 15 mL  15 mL Oral Q6H PRN Gt Salgado MD       • cloNIDine (CATAPRES) tablet 0.1 mg  0.1 mg Oral BID PRN Gt Salgado MD        Followed by   • [START ON 4/6/2020] cloNIDine (CATAPRES) tablet 0.1 mg  0.1 mg Oral Daily PRN Gt Salgado MD       • cyclobenzaprine (FLEXERIL) tablet 10 mg  10 mg Oral TID PRN Gt Salgado MD       • dicyclomine (BENTYL) capsule 10 mg  10 mg Oral TID PRN Gt Salgado MD   10 mg at 04/05/20 0851   • famotidine (PEPCID) tablet 20 mg  20 mg Oral BID PRN Gt Salgado MD       • fluticasone (FLONASE) 50 MCG/ACT nasal spray 1 spray  1 spray Each Nare BID Augustine Arguello MD   1 spray at 04/05/20 0817   • hydrOXYzine (ATARAX) tablet 50 mg  50 mg Oral Q6H PRN Gt Salgado MD   50 mg at 04/05/20 0818   • loperamide (IMODIUM) capsule 2 mg  2 mg Oral Q2H PRN Gt Salgado MD       • magnesium hydroxide (MILK OF MAGNESIA) suspension 2400  "mg/10mL 10 mL  10 mL Oral Daily PRN Gt Salgado MD       • mirtazapine (REMERON) tablet 15 mg  15 mg Oral Nightly Augustine Arguello MD   15 mg at 04/04/20 2107   • nicotine (NICODERM CQ) 21 MG/24HR patch 1 patch  1 patch Transdermal Q24H Gt Salgado MD   1 patch at 04/05/20 0817   • OLANZapine (zyPREXA) tablet 5 mg  5 mg Oral BID Augustine Arguello MD   5 mg at 04/05/20 0817   • OLANZapine zydis (zyPREXA) disintegrating tablet 5 mg  5 mg Oral TID PRN Augustine Arguello MD       • ondansetron (ZOFRAN) tablet 4 mg  4 mg Oral Q6H PRN Gt Salgado MD   4 mg at 04/04/20 1709   • sertraline (ZOLOFT) tablet 50 mg  50 mg Oral Daily Iggy Underwood MD   50 mg at 04/05/20 0817   • sodium chloride nasal spray 2 spray  2 spray Each Nare PRN Gt Salgado MD       • traZODone (DESYREL) tablet 100 mg  100 mg Oral Nightly Iggy Underwood MD   100 mg at 04/04/20 2107       Review of Symptoms:    Review of Systems   Constitutional: Negative.    HENT: Negative.    Eyes: Negative.    Respiratory: Negative.    Cardiovascular: Negative.    Gastrointestinal: Negative.    Endocrine: Negative.    Genitourinary: Negative.    Musculoskeletal: Negative.    Skin: Negative.    Allergic/Immunologic: Negative.    Neurological: Negative.    Hematological: Negative.    Psychiatric/Behavioral: Negative.          Physical Exam:   Blood pressure 94/59, pulse 68, temperature 98.3 °F (36.8 °C), temperature source Temporal, resp. rate 18, height 162.6 cm (64\"), weight 45.6 kg (100 lb 9.6 oz), last menstrual period 03/01/2020, SpO2 99 %, not currently breastfeeding.    Appearance: Patient female of stated age in no acute distress  Gait, Station, Strength: Within normal limits    Mental Status Exam:   Mental Status exam performed 4/5/2020 and patient shows no significant changes from previous exam.    Hygiene:   good  Cooperation:  Cooperative  Eye Contact:  Poor  Psychomotor Behavior:  Appropriate  Affect:  " Restricted  Mood: euthymic  Hopelessness: Denies  Speech:  Normal  Thought Process:  Goal directed and Tangential  Thought Content:  Bizarre and Mood congruent  Suicidal:  None  Homicidal:  None  Hallucinations:  Auditory and Not demonstrated today  Delusion:  None  Memory:  Intact  Orientation:  Person, Place, Time and Situation  Reliability:  poor  Insight:  Poor  Judgement:  Poor  Impulse Control:  Poor  Physical/Medical Issues:  No        Lab Results:   Admission on 04/02/2020, Discharged on 04/02/2020   Component Date Value Ref Range Status   • Glucose 04/02/2020 95  65 - 99 mg/dL Final   • BUN 04/02/2020 6  6 - 20 mg/dL Final   • Creatinine 04/02/2020 0.68  0.57 - 1.00 mg/dL Final   • Sodium 04/02/2020 140  136 - 145 mmol/L Final   • Potassium 04/02/2020 3.5  3.5 - 5.2 mmol/L Final   • Chloride 04/02/2020 105  98 - 107 mmol/L Final   • CO2 04/02/2020 22.7  22.0 - 29.0 mmol/L Final   • Calcium 04/02/2020 9.3  8.6 - 10.5 mg/dL Final   • Total Protein 04/02/2020 7.4  6.0 - 8.5 g/dL Final   • Albumin 04/02/2020 4.69  3.50 - 5.20 g/dL Final   • ALT (SGPT) 04/02/2020 6  1 - 33 U/L Final   • AST (SGOT) 04/02/2020 10  1 - 32 U/L Final   • Alkaline Phosphatase 04/02/2020 64  39 - 117 U/L Final   • Total Bilirubin 04/02/2020 0.2  0.2 - 1.2 mg/dL Final   • eGFR Non African Amer 04/02/2020 104  >60 mL/min/1.73 Final   • Globulin 04/02/2020 2.7  gm/dL Final   • A/G Ratio 04/02/2020 1.7  g/dL Final   • BUN/Creatinine Ratio 04/02/2020 8.8  7.0 - 25.0 Final   • Anion Gap 04/02/2020 12.3  5.0 - 15.0 mmol/L Final   • HCG Qualitative 04/02/2020 Negative  Negative Final   • Color, UA 04/02/2020 Yellow  Yellow, Straw Final   • Appearance, UA 04/02/2020 Clear  Clear Final   • pH, UA 04/02/2020 7.5  5.0 - 8.0 Final   • Specific Gravity, UA 04/02/2020 1.013  1.005 - 1.030 Final   • Glucose, UA 04/02/2020 Negative  Negative Final   • Ketones, UA 04/02/2020 Negative  Negative Final   • Bilirubin, UA 04/02/2020 Negative  Negative Final     • Blood, UA 04/02/2020 Negative  Negative Final   • Protein, UA 04/02/2020 Negative  Negative Final   • Leuk Esterase, UA 04/02/2020 Negative  Negative Final   • Nitrite, UA 04/02/2020 Negative  Negative Final   • Urobilinogen, UA 04/02/2020 1.0 E.U./dL  0.2 - 1.0 E.U./dL Final   • Amphetamine Screen, Urine 04/02/2020 Positive* Negative Final   • Barbiturates Screen, Urine 04/02/2020 Negative  Negative Final   • Benzodiazepine Screen, Urine 04/02/2020 Negative  Negative Final   • Cocaine Screen, Urine 04/02/2020 Negative  Negative Final   • Methadone Screen, Urine 04/02/2020 Negative  Negative Final   • Opiate Screen 04/02/2020 Negative  Negative Final   • Phencyclidine (PCP), Urine 04/02/2020 Negative  Negative Final   • THC, Screen, Urine 04/02/2020 Negative  Negative Final   • 6-ACETYL MORPHINE 04/02/2020 Negative  Negative Final   • Buprenorphine, Screen, Urine 04/02/2020 Positive* Negative Final   • Oxycodone Screen, Urine 04/02/2020 Negative  Negative Final   • Ethanol 04/02/2020 <10  0 - 10 mg/dL Final   • Ethanol % 04/02/2020 <0.010  % Final   • Acetaminophen 04/02/2020 <5.0* 10.0 - 30.0 mcg/mL Final   • Salicylate 04/02/2020 0.4  <=30.0 mg/dL Final   • WBC 04/02/2020 11.05* 3.40 - 10.80 10*3/mm3 Final   • RBC 04/02/2020 3.86  3.77 - 5.28 10*6/mm3 Final   • Hemoglobin 04/02/2020 11.3* 12.0 - 15.9 g/dL Final   • Hematocrit 04/02/2020 34.7  34.0 - 46.6 % Final   • MCV 04/02/2020 89.9  79.0 - 97.0 fL Final   • MCH 04/02/2020 29.3  26.6 - 33.0 pg Final   • MCHC 04/02/2020 32.6  31.5 - 35.7 g/dL Final   • RDW 04/02/2020 12.6  12.3 - 15.4 % Final   • RDW-SD 04/02/2020 40.9  37.0 - 54.0 fl Final   • MPV 04/02/2020 10.0  6.0 - 12.0 fL Final   • Platelets 04/02/2020 343  140 - 450 10*3/mm3 Final   • Neutrophil % 04/02/2020 63.9  42.7 - 76.0 % Final   • Lymphocyte % 04/02/2020 28.0  19.6 - 45.3 % Final   • Monocyte % 04/02/2020 6.2  5.0 - 12.0 % Final   • Eosinophil % 04/02/2020 1.0  0.3 - 6.2 % Final   • Basophil %  04/02/2020 0.6  0.0 - 1.5 % Final   • Immature Grans % 04/02/2020 0.3  0.0 - 0.5 % Final   • Neutrophils, Absolute 04/02/2020 7.06* 1.70 - 7.00 10*3/mm3 Final   • Lymphocytes, Absolute 04/02/2020 3.09  0.70 - 3.10 10*3/mm3 Final   • Monocytes, Absolute 04/02/2020 0.69  0.10 - 0.90 10*3/mm3 Final   • Eosinophils, Absolute 04/02/2020 0.11  0.00 - 0.40 10*3/mm3 Final   • Basophils, Absolute 04/02/2020 0.07  0.00 - 0.20 10*3/mm3 Final   • Immature Grans, Absolute 04/02/2020 0.03  0.00 - 0.05 10*3/mm3 Final   • nRBC 04/02/2020 0.0  0.0 - 0.2 /100 WBC Final       Assessment/Plan     Other recurrent depressive disorders (CMS/[facility])  -Start mirtazapine 15 mg p.o. Nightly  -Continue home Zoloft 50 mg p.o. daily      Schizophrenia, hebephrenic type (CMS/[facility])  -Discontinue Abilify due to side effects  -Start Zyprexa 5 mg p.o. twice daily      Polysubstance dependence including opioid type drug with complication, episodic abuse (CMS/[facility])  -Encourage cessation  -Encourage to return to rehab      Methamphetamine abuse (CMS/[facility])  -Encourage cessation  -Encourage to return to rehab      Opiate abuse, continuous (CMS/[facility])  -Clonidine detox with COWS withdrawal checks   -Encourage cessation  -Encourage to return to rehab    Insomnia  -Trazodone 100mg PO QHS     URI symptoms  -Patient noted to have slight temperature of 99.9 overnight.  She reported some sore throat and was noted to have some redness in the tonsillar region.  She denied any issues to me this morning.  She denied fever, chills, diaphoresis.  She denied sore throat.  -Continue to monitor      The patient has been admitted for safety and stabilization.  Patient will be monitored for suicidality daily and maintained on special precautions .  The patient will have individual and group therapy with a master's level therapist. A master treatment plan will be developed and agreed upon by the patient and his/her treatment team.  The patient's estimated length of stay in the  hospital is 5-7 days.       This document has been electronically signed by Iggy Underwood MD  April 5, 2020 13:00

## 2020-04-05 NOTE — NURSING NOTE
Pt's condition assessed. Pt reports complaints  of aching, sore throat and neck. Lymph nodes noted to be swollen under mandibular area bilaterally.Tonsillar area slightly reddened. Pt rating pain 10 on numeric pain scale. Lead RN notifed of pt's condition. Procedural mask placed on patient at this time.Pt educated on importance of keeping mask on and to remain in her room until further notice.Pt verbalized understanding of instructions. Pt was offered and provided with fluids.

## 2020-04-05 NOTE — PLAN OF CARE
Problem: Patient Care Overview  Goal: Plan of Care Review  Outcome: Ongoing (interventions implemented as appropriate)  Flowsheets (Taken 4/5/2020 3383)  Progress: improving  Plan of Care Reviewed With: patient  Patient Agreement with Plan of Care: agrees  Outcome Summary: Patient seeks staff often, requests medications frequently. Patient reports anxiety at 7 and depression at 5 on 0-10 scale with thoughts of harming self noted. Patient denies any intent of harming self at present. Patient appetite is good but sleep has been disrupted. No acute distress noted, will continue to monitor,

## 2020-04-06 VITALS
WEIGHT: 100.6 LBS | HEART RATE: 69 BPM | OXYGEN SATURATION: 97 % | TEMPERATURE: 98.2 F | DIASTOLIC BLOOD PRESSURE: 55 MMHG | RESPIRATION RATE: 18 BRPM | BODY MASS INDEX: 17.17 KG/M2 | HEIGHT: 64 IN | SYSTOLIC BLOOD PRESSURE: 91 MMHG

## 2020-04-06 PROCEDURE — 99239 HOSP IP/OBS DSCHRG MGMT >30: CPT | Performed by: PSYCHIATRY & NEUROLOGY

## 2020-04-06 RX ORDER — LURASIDONE HYDROCHLORIDE 40 MG/1
40 TABLET, FILM COATED ORAL DAILY
Qty: 30 TABLET | Refills: 0 | Status: SHIPPED | OUTPATIENT
Start: 2020-04-06

## 2020-04-06 RX ORDER — FLUTICASONE PROPIONATE 50 MCG
1 SPRAY, SUSPENSION (ML) NASAL 2 TIMES DAILY
Qty: 16 G | Refills: 0 | Status: SHIPPED | OUTPATIENT
Start: 2020-04-06

## 2020-04-06 RX ORDER — MIRTAZAPINE 30 MG/1
30 TABLET, FILM COATED ORAL
Qty: 30 TABLET | Refills: 0 | Status: SHIPPED | OUTPATIENT
Start: 2020-04-06

## 2020-04-06 RX ADMIN — HYDROXYZINE HYDROCHLORIDE 50 MG: 50 TABLET ORAL at 08:02

## 2020-04-06 RX ADMIN — FLUTICASONE PROPIONATE 1 SPRAY: 50 SPRAY, METERED NASAL at 08:03

## 2020-04-06 RX ADMIN — ACETAMINOPHEN 650 MG: 325 TABLET ORAL at 08:02

## 2020-04-06 RX ADMIN — SERTRALINE 50 MG: 50 TABLET, FILM COATED ORAL at 08:02

## 2020-04-06 RX ADMIN — NICOTINE 1 PATCH: 21 PATCH TRANSDERMAL at 08:02

## 2020-04-06 RX ADMIN — OLANZAPINE 5 MG: 5 TABLET ORAL at 08:02

## 2020-04-06 NOTE — PLAN OF CARE
Problem: Patient Care Overview  Goal: Plan of Care Review  Outcome: Ongoing (interventions implemented as appropriate)  Flowsheets (Taken 4/6/2020 0324)  Progress: no change  Plan of Care Reviewed With: patient  Patient Agreement with Plan of Care: agrees  Outcome Summary: Patient continues to seek out staff frequently to ask for medications. Patient is somatic. Rates anxiety a 7 and depression a 5. Reports SI with a plan to cut her wrists. Rates cravings a 10 with mild withdrawal symptoms. Denies HI or AVH.

## 2020-04-06 NOTE — DISCHARGE SUMMARY
"  Date of Discharge:  4/6/2020    Discharge Diagnosis:Principal Problem:    Schizophrenia, hebephrenic type (CMS/HCC)  Active Problems:    Polysubstance dependence including opioid type drug with complication, episodic abuse (CMS/HCC)    Methamphetamine abuse (CMS/HCC)    Opiate abuse, continuous (CMS/HCC)    Other recurrent depressive disorders (CMS/HCC)        Presenting Problem/History of Present Illness:Patient was admitted to the hospital on April 2 having presented depressed verbalizing suicidal intent, voluntarily admitted for safety evaluation and treatment, see admission note for details.         Hospital Course:  Patient was admitted for safety and stabilization and was placed on standard precautions.  Routine labs were checked.  Patient was assigned a masters level therapist and provided with an opportunity to participate in group and individual therapy on the unit.  Patient seen on a daily basis for evaluation and supportive therapy.  Patient's depression cleared steadily throughout her hospital stay, she continued to report the experience of auditory hallucinations with associated low-grade paranoia.  Patient presented on the morning of April 6 stating that she wanted to go home returning to the household of Mr. Juan Manuel Lezama the 65-year-old gentleman she stays with on occasion.  Patient that it be known that she would seek out Suboxone and a benzodiazepine RX and \"if I cannot get that I probably go back to meth, makes me feel good\".  Patient states she would follow-up at the [facility] outpatient clinic in [place] declining referral back into a residential chemical dependency treatment program.  Patient states that [facility] has helped her the best of the SGA's so we will go with that at discharge.  Patient also requesting prescription for Suboxone and are benzodiazepine, declined.  Prognosis would have to be considered poor given her stated intent to resume her drug use.  Patient does not meet criteria for " involuntary hold.  See below for medications.    Consults:   Consults     No orders found from 3/4/2020 to 4/3/2020.          Labs:  Lab Results (all)     None          Imaging:  Imaging Results (All)     None                  Condition on Discharge:  improved    Prognosis: Guarded due to the almost certain relapse that will include opiates or  methamphetamine or both    Vital Signs  Temp:  [98.2 °F (36.8 °C)-99.4 °F (37.4 °C)] 98.2 °F (36.8 °C)  Heart Rate:  [64-70] 69  Resp:  [18] 18  BP: ()/(55-67) 91/55    Discharge Disposition  Home or Self Care    Discharge Medications     Discharge Medications      New Medications      Instructions Start Date   mirtazapine 30 MG tablet  Commonly known as:  Remeron   One at bedtime         Continue These Medications      Instructions Start Date   fluticasone 50 MCG/ACT nasal spray  Commonly known as:  FLONASE   1 spray, Nasal, 2 Times Daily      lurasidone 40 MG tablet tablet  Commonly known as:  LATUDA   40 mg, Oral, Daily         Stop These Medications    sertraline 50 MG tablet  Commonly known as:  ZOLOFT     traZODone 100 MG tablet  Commonly known as:  DESYREL            Discharge Diet:     Regular                                                   Activity at Discharge: No restrictions    Follow-up Appointments: Patient be followed up that the document office in [facility]          Augustine Arguello MD  04/06/20  11:12  Time spent with the discharge process >30 minutes.     Dictated utilizing Dragon dictation

## 2020-04-06 NOTE — PROGRESS NOTES
Patient requests that therapist assist her in calling her fiance to pick her up since she is being discharged. Therapist assisted in allowing patient to call and he was agreeable to pick patient up.    Patient denies any suicidal ideation this date.    Patient has a follow up appt scheduled at [facility] in [place]

## 2020-04-06 NOTE — PROGRESS NOTES
..Bridge Session  Date: 04/06/2020   Time: 1140    Data:  Reason for Inpatient Admission: Depression, psychosis, substance abuse    Follow up: [address] [address]  [phone]    April 9 2020 at 2:00pm with Clarita Otto will call you for a telehealth appointment. You will not be going into the office.         Coping Skills to Utilize: patient was encouraged to engage in physical activity, engage support system, continue work on relapse prevention, and attend AA/NA and/or [facility]    Crisis Safety Plan:  • Support System to utilize and contact numbers: patient reports her arturoe is supportive and patient has contact number    • Educated on crisis hotline numbers (yes/no): Yes    • Was the Patient made aware of contact information for the following: community mental health centers, crisis stabilization programs, residential programs, , etc (yes/no): Yes    Will transportation be a barrier (yes/no): No  • If so, explain solution(s) to resolve barrier: N/A    • How and where will the patient obtain prescribed medications: Patients medications were filled today by [facility] and brought to patient.  Patients insurance covered the cost of the medications.    Assessment: Patient denies suicidal ideation today and patient denies homicidal ideation today.  Patient reports feeling better today with decreased anxiety and depression.  Patient reports feeling better and ready to return home today.  She verbalizes understanding of the importance of safety and aftercare.    Plan:    Discussed the importance of follow up treatment for continuity of care. The Patient was able to verbalize understanding and commitment to the individualized aftercare and crisis safety plan.      Trudy Hogan LCSW

## 2020-04-06 NOTE — NURSING NOTE
Educated patient on the importance of proper handwashing, covering mouth while coughing or sneezing, and social distancing. Patient verbalizes understanding of education.

## 2021-09-24 ENCOUNTER — HOSPITAL ENCOUNTER (EMERGENCY)
Facility: HOSPITAL | Age: 26
Discharge: HOME | End: 2021-09-24
Attending: EMERGENCY MEDICINE | Admitting: EMERGENCY MEDICINE

## 2021-09-24 VITALS
HEIGHT: 64 IN | TEMPERATURE: 99.8 F | HEART RATE: 119 BPM | BODY MASS INDEX: 17.07 KG/M2 | RESPIRATION RATE: 20 BRPM | OXYGEN SATURATION: 97 % | SYSTOLIC BLOOD PRESSURE: 110 MMHG | WEIGHT: 100 LBS | DIASTOLIC BLOOD PRESSURE: 87 MMHG

## 2021-09-24 DIAGNOSIS — F19.10 POLYSUBSTANCE ABUSE (HCC): Primary | ICD-10-CM

## 2021-09-24 LAB
ALBUMIN SERPL-MCNC: 5.13 G/DL (ref 3.5–5.2)
ALBUMIN/GLOB SERPL: 1.9 G/DL
ALP SERPL-CCNC: 81 U/L (ref 39–117)
ALT SERPL W P-5'-P-CCNC: 12 U/L (ref 1–33)
AMPHET+METHAMPHET UR QL: POSITIVE
AMPHETAMINES UR QL: POSITIVE
ANION GAP SERPL CALCULATED.3IONS-SCNC: 13.5 MMOL/L (ref 5–15)
AST SERPL-CCNC: 20 U/L (ref 1–32)
B-HCG UR QL: NEGATIVE
BACTERIA UR QL AUTO: ABNORMAL /HPF
BARBITURATES UR QL SCN: NEGATIVE
BASOPHILS # BLD AUTO: 0.13 10*3/MM3 (ref 0–0.2)
BASOPHILS NFR BLD AUTO: 1.1 % (ref 0–1.5)
BENZODIAZ UR QL SCN: NEGATIVE
BILIRUB SERPL-MCNC: 0.6 MG/DL (ref 0–1.2)
BILIRUB UR QL STRIP: NEGATIVE
BUN SERPL-MCNC: 22 MG/DL (ref 6–20)
BUN/CREAT SERPL: 25.3 (ref 7–25)
BUPRENORPHINE SERPL-MCNC: NEGATIVE NG/ML
CALCIUM SPEC-SCNC: 9.8 MG/DL (ref 8.6–10.5)
CANNABINOIDS SERPL QL: NEGATIVE
CHLORIDE SERPL-SCNC: 103 MMOL/L (ref 98–107)
CLARITY UR: ABNORMAL
CO2 SERPL-SCNC: 24.5 MMOL/L (ref 22–29)
COCAINE UR QL: NEGATIVE
COLOR UR: YELLOW
CREAT SERPL-MCNC: 0.87 MG/DL (ref 0.57–1)
DEPRECATED RDW RBC AUTO: 42.1 FL (ref 37–54)
EOSINOPHIL # BLD AUTO: 0.25 10*3/MM3 (ref 0–0.4)
EOSINOPHIL NFR BLD AUTO: 2.2 % (ref 0.3–6.2)
ERYTHROCYTE [DISTWIDTH] IN BLOOD BY AUTOMATED COUNT: 12.9 % (ref 12.3–15.4)
ETHANOL BLD-MCNC: <10 MG/DL (ref 0–10)
ETHANOL UR QL: <0.01 %
GFR SERPL CREATININE-BSD FRML MDRD: 78 ML/MIN/1.73
GLOBULIN UR ELPH-MCNC: 2.8 GM/DL
GLUCOSE SERPL-MCNC: 72 MG/DL (ref 65–99)
GLUCOSE UR STRIP-MCNC: NEGATIVE MG/DL
HAV IGM SERPL QL IA: NORMAL
HBV CORE IGM SERPL QL IA: NORMAL
HBV SURFACE AG SERPL QL IA: NORMAL
HCT VFR BLD AUTO: 36.9 % (ref 34–46.6)
HCV AB SER DONR QL: NORMAL
HGB BLD-MCNC: 12.2 G/DL (ref 12–15.9)
HGB UR QL STRIP.AUTO: NEGATIVE
HIV1+2 AB SER QL: NORMAL
HYALINE CASTS UR QL AUTO: ABNORMAL /LPF
IMM GRANULOCYTES # BLD AUTO: 0.04 10*3/MM3 (ref 0–0.05)
IMM GRANULOCYTES NFR BLD AUTO: 0.3 % (ref 0–0.5)
KETONES UR QL STRIP: NEGATIVE
LEUKOCYTE ESTERASE UR QL STRIP.AUTO: ABNORMAL
LYMPHOCYTES # BLD AUTO: 3.24 10*3/MM3 (ref 0.7–3.1)
LYMPHOCYTES NFR BLD AUTO: 28.1 % (ref 19.6–45.3)
MAGNESIUM SERPL-MCNC: 2.2 MG/DL (ref 1.6–2.6)
MCH RBC QN AUTO: 29.5 PG (ref 26.6–33)
MCHC RBC AUTO-ENTMCNC: 33.1 G/DL (ref 31.5–35.7)
MCV RBC AUTO: 89.3 FL (ref 79–97)
METHADONE UR QL SCN: NEGATIVE
MONOCYTES # BLD AUTO: 0.87 10*3/MM3 (ref 0.1–0.9)
MONOCYTES NFR BLD AUTO: 7.5 % (ref 5–12)
NEUTROPHILS NFR BLD AUTO: 60.8 % (ref 42.7–76)
NEUTROPHILS NFR BLD AUTO: 7.01 10*3/MM3 (ref 1.7–7)
NITRITE UR QL STRIP: NEGATIVE
NRBC BLD AUTO-RTO: 0 /100 WBC (ref 0–0.2)
OPIATES UR QL: NEGATIVE
OXYCODONE UR QL SCN: NEGATIVE
PCP UR QL SCN: NEGATIVE
PH UR STRIP.AUTO: 7 [PH] (ref 5–8)
PLATELET # BLD AUTO: 357 10*3/MM3 (ref 140–450)
PMV BLD AUTO: 9.8 FL (ref 6–12)
POTASSIUM SERPL-SCNC: 3.8 MMOL/L (ref 3.5–5.2)
PROPOXYPH UR QL: NEGATIVE
PROT SERPL-MCNC: 7.9 G/DL (ref 6–8.5)
PROT UR QL STRIP: NEGATIVE
RBC # BLD AUTO: 4.13 10*6/MM3 (ref 3.77–5.28)
RBC # UR: ABNORMAL /HPF
REF LAB TEST METHOD: ABNORMAL
SARS-COV-2 RNA RESP QL NAA+PROBE: NOT DETECTED
SODIUM SERPL-SCNC: 141 MMOL/L (ref 136–145)
SP GR UR STRIP: 1.02 (ref 1–1.03)
SQUAMOUS #/AREA URNS HPF: ABNORMAL /HPF
TRICYCLICS UR QL SCN: NEGATIVE
UROBILINOGEN UR QL STRIP: ABNORMAL
WBC # BLD AUTO: 11.54 10*3/MM3 (ref 3.4–10.8)
WBC UR QL AUTO: ABNORMAL /HPF

## 2021-09-24 PROCEDURE — 81025 URINE PREGNANCY TEST: CPT | Performed by: EMERGENCY MEDICINE

## 2021-09-24 PROCEDURE — 83735 ASSAY OF MAGNESIUM: CPT | Performed by: EMERGENCY MEDICINE

## 2021-09-24 PROCEDURE — G0432 EIA HIV-1/HIV-2 SCREEN: HCPCS | Performed by: EMERGENCY MEDICINE

## 2021-09-24 PROCEDURE — 85025 COMPLETE CBC W/AUTO DIFF WBC: CPT | Performed by: EMERGENCY MEDICINE

## 2021-09-24 PROCEDURE — 81001 URINALYSIS AUTO W/SCOPE: CPT | Performed by: EMERGENCY MEDICINE

## 2021-09-24 PROCEDURE — 80053 COMPREHEN METABOLIC PANEL: CPT | Performed by: EMERGENCY MEDICINE

## 2021-09-24 PROCEDURE — 80306 DRUG TEST PRSMV INSTRMNT: CPT | Performed by: EMERGENCY MEDICINE

## 2021-09-24 PROCEDURE — 80074 ACUTE HEPATITIS PANEL: CPT | Performed by: EMERGENCY MEDICINE

## 2021-09-24 PROCEDURE — 82077 ASSAY SPEC XCP UR&BREATH IA: CPT | Performed by: EMERGENCY MEDICINE

## 2021-09-24 PROCEDURE — 87635 SARS-COV-2 COVID-19 AMP PRB: CPT | Performed by: EMERGENCY MEDICINE

## 2021-09-24 PROCEDURE — 99284 EMERGENCY DEPT VISIT MOD MDM: CPT

## 2021-09-24 NOTE — NURSING NOTE
Called and provided intake information including all abnormal  labs to Dr Salgado .discharge orders received.RBVOx2. Patient and ED provider aware.

## 2021-09-24 NOTE — ED PROVIDER NOTES
Subjective   Patient wants detox from methamphetamine, xanax, and percocet      Drug / Alcohol Assessment  Primary symptoms include weakness. This is a recurrent problem. Suspected agents include opiates, prescription drugs and methamphetamines.       Review of Systems   Constitutional: Positive for activity change and fatigue.   HENT: Negative.    Eyes: Negative.    Respiratory: Negative.    Cardiovascular: Negative.    Gastrointestinal: Negative.    Endocrine: Negative.    Genitourinary: Negative.    Musculoskeletal: Negative.    Skin: Negative.    Allergic/Immunologic: Negative.    Neurological: Positive for weakness.   Hematological: Negative.    Psychiatric/Behavioral: Positive for dysphoric mood.       Past Medical History:   Diagnosis Date   • Anxiety    • Chronic pain disorder    • Depression    • Psychosis (CMS/[facility])    • Schizoaffective disorder (CMS/[facility])    • Scoliosis    • Substance abuse (CMS/[facility])    • Suicidal thoughts    • Suicide attempt (CMS/[facility])     cut wrist at 20 years old   • Withdrawal symptoms, drug or narcotic (CMS/[facility])        Allergies   Allergen Reactions   • Codeine Itching   • Ibuprofen GI Intolerance   • Penicillins Hives   • Toradol [Ketorolac Tromethamine] Cough       Past Surgical History:   Procedure Laterality Date   • NO PAST SURGERIES         Family History   Problem Relation Age of Onset   • Suicide Attempts Maternal Uncle    • Suicide Attempts Father        Social History     Socioeconomic History   • Marital status: Single     Spouse name: Not on file   • Number of children: Not on file   • Years of education: Not on file   • Highest education level: Not on file   Tobacco Use   • Smoking status: Current Every Day Smoker     Packs/day: 1.50     Years: 11.00     Pack years: 16.50     Types: Cigarettes   • Smokeless tobacco: Never Used   • Tobacco comment: started smoking at 14 years old   Substance and Sexual Activity   • Alcohol use: Not Currently   • Drug use: Not Currently      Types: Methamphetamines   • Sexual activity: Yes     Partners: Male           Objective   Physical Exam  Vitals and nursing note reviewed.   Constitutional:       Appearance: Normal appearance.   HENT:      Head: Normocephalic.      Nose: Nose normal.      Mouth/Throat:      Mouth: Mucous membranes are moist.   Eyes:      Pupils: Pupils are equal, round, and reactive to light.   Cardiovascular:      Rate and Rhythm: Normal rate.      Pulses: Normal pulses.   Pulmonary:      Effort: Pulmonary effort is normal.   Abdominal:      General: Abdomen is flat.   Musculoskeletal:         General: Normal range of motion.      Cervical back: Normal range of motion.   Skin:     General: Skin is warm.      Capillary Refill: Capillary refill takes less than 2 seconds.   Neurological:      General: No focal deficit present.      Mental Status: She is alert.   Psychiatric:         Mood and Affect: Mood normal.         Procedures           ED Course                                           MDM    Final diagnoses:   Polysubstance abuse (HCC)       ED Disposition  ED Disposition     ED Disposition Condition Comment    Discharge Stable           Jeanette Allison, APRN  [address] [facility] [address]  [phone]    Schedule an appointment as soon as possible for a visit   If symptoms worsen         Medication List      No changes were made to your prescriptions during this visit.          Derek Choudhury MD  09/24/21 1505       Derek Choudhury MD  09/24/21 1707       Derek Choudhury MD  09/28/21 1425

## 2021-09-24 NOTE — NURSING NOTE
Full intake assessment completed awaiting Lab results.Patient presented to ED with would appear to be Meth related psychosis. Patient reported some one has putting something in my METH. Patient reported she came in for a blood test to confirm poison was in in her blood.She stated if there was nothing in her blood she wants go home and go to [facility]. She reported she has been to  3 times before and wants to return. Patient denies SI,HI,alcohol, and AVH.patient reported poor sleep and good appetite. Patient alert and oriented X 3

## 2023-08-26 ENCOUNTER — HOSPITAL ENCOUNTER (EMERGENCY)
Facility: HOSPITAL | Age: 28
Discharge: HOME | End: 2023-08-26
Attending: STUDENT IN AN ORGANIZED HEALTH CARE EDUCATION/TRAINING PROGRAM
Payer: MEDICAID

## 2023-08-26 ENCOUNTER — APPOINTMENT (OUTPATIENT)
Dept: GENERAL RADIOLOGY | Facility: HOSPITAL | Age: 28
End: 2023-08-26
Payer: MEDICAID

## 2023-08-26 VITALS
DIASTOLIC BLOOD PRESSURE: 68 MMHG | WEIGHT: 125 LBS | HEART RATE: 81 BPM | BODY MASS INDEX: 21.34 KG/M2 | RESPIRATION RATE: 18 BRPM | OXYGEN SATURATION: 98 % | SYSTOLIC BLOOD PRESSURE: 112 MMHG | TEMPERATURE: 98 F | HEIGHT: 64 IN

## 2023-08-26 DIAGNOSIS — U07.1 COVID-19: Primary | ICD-10-CM

## 2023-08-26 LAB
ALBUMIN SERPL-MCNC: 4.2 G/DL (ref 3.5–5.2)
ALBUMIN/GLOB SERPL: 1.6 G/DL
ALP SERPL-CCNC: 70 U/L (ref 39–117)
ALT SERPL W P-5'-P-CCNC: 7 U/L (ref 1–33)
ANION GAP SERPL CALCULATED.3IONS-SCNC: 12 MMOL/L (ref 5–15)
AST SERPL-CCNC: 13 U/L (ref 1–32)
BACTERIA UR QL AUTO: ABNORMAL /HPF
BASOPHILS # BLD AUTO: 0.04 10*3/MM3 (ref 0–0.2)
BASOPHILS NFR BLD AUTO: 0.6 % (ref 0–1.5)
BILIRUB SERPL-MCNC: <0.2 MG/DL (ref 0–1.2)
BILIRUB UR QL STRIP: NEGATIVE
BUN SERPL-MCNC: 9 MG/DL (ref 6–20)
BUN/CREAT SERPL: 13.2 (ref 7–25)
CALCIUM SPEC-SCNC: 9.1 MG/DL (ref 8.6–10.5)
CHLORIDE SERPL-SCNC: 105 MMOL/L (ref 98–107)
CLARITY UR: CLEAR
CO2 SERPL-SCNC: 22 MMOL/L (ref 22–29)
COLOR UR: YELLOW
CREAT SERPL-MCNC: 0.68 MG/DL (ref 0.57–1)
CRP SERPL-MCNC: 0.72 MG/DL (ref 0–0.5)
DEPRECATED RDW RBC AUTO: 40.5 FL (ref 37–54)
EGFRCR SERPLBLD CKD-EPI 2021: 120.3 ML/MIN/1.73
EOSINOPHIL # BLD AUTO: 0.19 10*3/MM3 (ref 0–0.4)
EOSINOPHIL NFR BLD AUTO: 2.8 % (ref 0.3–6.2)
ERYTHROCYTE [DISTWIDTH] IN BLOOD BY AUTOMATED COUNT: 12.1 % (ref 12.3–15.4)
FERRITIN SERPL-MCNC: 50.1 NG/ML (ref 13–150)
FLUAV RNA RESP QL NAA+PROBE: NOT DETECTED
FLUBV RNA ISLT QL NAA+PROBE: NOT DETECTED
GLOBULIN UR ELPH-MCNC: 2.6 GM/DL
GLUCOSE SERPL-MCNC: 97 MG/DL (ref 65–99)
GLUCOSE UR STRIP-MCNC: NEGATIVE MG/DL
HCT VFR BLD AUTO: 34.5 % (ref 34–46.6)
HGB BLD-MCNC: 11.4 G/DL (ref 12–15.9)
HGB UR QL STRIP.AUTO: ABNORMAL
HYALINE CASTS UR QL AUTO: ABNORMAL /LPF
IMM GRANULOCYTES # BLD AUTO: 0.01 10*3/MM3 (ref 0–0.05)
IMM GRANULOCYTES NFR BLD AUTO: 0.1 % (ref 0–0.5)
KETONES UR QL STRIP: NEGATIVE
LEUKOCYTE ESTERASE UR QL STRIP.AUTO: NEGATIVE
LYMPHOCYTES # BLD AUTO: 2.16 10*3/MM3 (ref 0.7–3.1)
LYMPHOCYTES NFR BLD AUTO: 31.5 % (ref 19.6–45.3)
MCH RBC QN AUTO: 30.1 PG (ref 26.6–33)
MCHC RBC AUTO-ENTMCNC: 33 G/DL (ref 31.5–35.7)
MCV RBC AUTO: 91 FL (ref 79–97)
MONOCYTES # BLD AUTO: 0.63 10*3/MM3 (ref 0.1–0.9)
MONOCYTES NFR BLD AUTO: 9.2 % (ref 5–12)
NEUTROPHILS NFR BLD AUTO: 3.83 10*3/MM3 (ref 1.7–7)
NEUTROPHILS NFR BLD AUTO: 55.8 % (ref 42.7–76)
NITRITE UR QL STRIP: NEGATIVE
NRBC BLD AUTO-RTO: 0 /100 WBC (ref 0–0.2)
PH UR STRIP.AUTO: 6 [PH] (ref 5–8)
PLATELET # BLD AUTO: 262 10*3/MM3 (ref 140–450)
PMV BLD AUTO: 10 FL (ref 6–12)
POTASSIUM SERPL-SCNC: 3.9 MMOL/L (ref 3.5–5.2)
PROCALCITONIN SERPL-MCNC: 0.03 NG/ML (ref 0–0.25)
PROT SERPL-MCNC: 6.8 G/DL (ref 6–8.5)
PROT UR QL STRIP: NEGATIVE
RBC # BLD AUTO: 3.79 10*6/MM3 (ref 3.77–5.28)
RBC # UR STRIP: ABNORMAL /HPF
REF LAB TEST METHOD: ABNORMAL
SARS-COV-2 RNA RESP QL NAA+PROBE: DETECTED
SODIUM SERPL-SCNC: 139 MMOL/L (ref 136–145)
SP GR UR STRIP: 1.02 (ref 1–1.03)
SQUAMOUS #/AREA URNS HPF: ABNORMAL /HPF
UROBILINOGEN UR QL STRIP: ABNORMAL
WBC # UR STRIP: ABNORMAL /HPF
WBC NRBC COR # BLD: 6.86 10*3/MM3 (ref 3.4–10.8)

## 2023-08-26 PROCEDURE — 81001 URINALYSIS AUTO W/SCOPE: CPT | Performed by: PHYSICIAN ASSISTANT

## 2023-08-26 PROCEDURE — 71045 X-RAY EXAM CHEST 1 VIEW: CPT | Performed by: RADIOLOGY

## 2023-08-26 PROCEDURE — 84145 PROCALCITONIN (PCT): CPT | Performed by: PHYSICIAN ASSISTANT

## 2023-08-26 PROCEDURE — 82728 ASSAY OF FERRITIN: CPT | Performed by: PHYSICIAN ASSISTANT

## 2023-08-26 PROCEDURE — 86140 C-REACTIVE PROTEIN: CPT | Performed by: PHYSICIAN ASSISTANT

## 2023-08-26 PROCEDURE — 96374 THER/PROPH/DIAG INJ IV PUSH: CPT

## 2023-08-26 PROCEDURE — 99283 EMERGENCY DEPT VISIT LOW MDM: CPT

## 2023-08-26 PROCEDURE — 87636 SARSCOV2 & INF A&B AMP PRB: CPT | Performed by: PHYSICIAN ASSISTANT

## 2023-08-26 PROCEDURE — 36415 COLL VENOUS BLD VENIPUNCTURE: CPT

## 2023-08-26 PROCEDURE — 25010000002 KETOROLAC TROMETHAMINE PER 15 MG: Performed by: PHYSICIAN ASSISTANT

## 2023-08-26 PROCEDURE — 80053 COMPREHEN METABOLIC PANEL: CPT | Performed by: PHYSICIAN ASSISTANT

## 2023-08-26 PROCEDURE — 71045 X-RAY EXAM CHEST 1 VIEW: CPT

## 2023-08-26 PROCEDURE — 85025 COMPLETE CBC W/AUTO DIFF WBC: CPT | Performed by: PHYSICIAN ASSISTANT

## 2023-08-26 RX ORDER — KETOROLAC TROMETHAMINE 30 MG/ML
30 INJECTION, SOLUTION INTRAMUSCULAR; INTRAVENOUS ONCE
Status: COMPLETED | OUTPATIENT
Start: 2023-08-26 | End: 2023-08-26

## 2023-08-26 RX ADMIN — KETOROLAC TROMETHAMINE 30 MG: 30 INJECTION, SOLUTION INTRAMUSCULAR; INTRAVENOUS at 17:30

## 2023-08-26 NOTE — ED PROVIDER NOTES
Subjective   History of Present Illness  28-year-old female who presents to the emergency department chief complaint cough, congestion, body aches.  Patient states she has been exposed to COVID-19.  With her roommate.  Patient has history of schizoaffective disorder, substance abuse, anxiety.    History provided by:  Patient   used: No    Cough  Cough characteristics:  Non-productive  Sputum characteristics:  Nondescript  Severity:  Severe  Onset quality:  Gradual  Duration:  2 days  Timing:  Intermittent  Progression:  Worsening  Chronicity:  New  Smoker: no    Context: not animal exposure, not exposure to allergens, not fumes, not occupational exposure, not sick contacts, not upper respiratory infection and not weather changes    Relieved by:  Nothing  Worsened by:  Nothing  Ineffective treatments:  None tried  Associated symptoms: chills and headaches    Associated symptoms: no ear pain, no rash, no sore throat and no weight loss    Risk factors: no chemical exposure, no recent infection and no recent travel      Review of Systems   Constitutional:  Positive for chills. Negative for weight loss.   HENT:  Negative for dental problem, drooling, ear discharge, ear pain and sore throat.    Eyes: Negative.  Negative for photophobia and pain.   Respiratory:  Positive for cough.    Cardiovascular: Negative.  Negative for leg swelling.   Gastrointestinal: Negative.  Negative for abdominal pain, anal bleeding, blood in stool, constipation and nausea.   Endocrine: Negative for heat intolerance and polydipsia.   Genitourinary: Negative.  Negative for difficulty urinating, dyspareunia, frequency, hematuria, pelvic pain and urgency.   Musculoskeletal: Negative.  Negative for back pain, joint swelling and neck pain.   Skin: Negative.  Negative for rash.   Neurological:  Positive for headaches.   Hematological: Negative.    Psychiatric/Behavioral: Negative.  Negative for confusion, decreased concentration,  dysphoric mood, hallucinations and self-injury. The patient is not hyperactive.    All other systems reviewed and are negative.    Past Medical History:   Diagnosis Date    Anxiety     Chronic pain disorder     Depression     Psychosis     Schizoaffective disorder     Scoliosis     Substance abuse     Suicidal thoughts     Suicide attempt     cut wrist at 20 years old    Withdrawal symptoms, drug or narcotic        Allergies   Allergen Reactions    Codeine Itching    Ibuprofen GI Intolerance    Penicillins Hives    Toradol [Ketorolac Tromethamine] Cough       Past Surgical History:   Procedure Laterality Date    NO PAST SURGERIES         Family History   Problem Relation Age of Onset    Suicide Attempts Maternal Uncle     Suicide Attempts Father        Social History     Socioeconomic History    Marital status: Single   Tobacco Use    Smoking status: Every Day     Packs/day: 1.50     Years: 11.00     Pack years: 16.50     Types: Cigarettes    Smokeless tobacco: Never    Tobacco comments:     started smoking at 14 years old   Substance and Sexual Activity    Alcohol use: Not Currently    Drug use: Not Currently     Types: Methamphetamines    Sexual activity: Yes     Partners: Male           Objective   Physical Exam  Vitals and nursing note reviewed.   Constitutional:       General: She is not in acute distress.     Appearance: Normal appearance. She is normal weight. She is not ill-appearing, toxic-appearing or diaphoretic.   HENT:      Head: Normocephalic and atraumatic.      Right Ear: Tympanic membrane, ear canal and external ear normal. There is no impacted cerumen.      Left Ear: Tympanic membrane, ear canal and external ear normal. There is no impacted cerumen.      Nose: Nose normal. No congestion or rhinorrhea.      Mouth/Throat:      Mouth: Mucous membranes are moist.      Pharynx: Oropharynx is clear. No oropharyngeal exudate or posterior oropharyngeal erythema.   Eyes:      General: No scleral icterus.         Right eye: No discharge.         Left eye: No discharge.      Extraocular Movements: Extraocular movements intact.      Conjunctiva/sclera: Conjunctivae normal.      Pupils: Pupils are equal, round, and reactive to light.   Cardiovascular:      Rate and Rhythm: Normal rate and regular rhythm.      Pulses: Normal pulses.      Heart sounds: Normal heart sounds. No murmur heard.    No friction rub. No gallop.   Pulmonary:      Effort: Pulmonary effort is normal. No respiratory distress.      Breath sounds: No stridor. No wheezing, rhonchi or rales.   Chest:      Chest wall: No tenderness.   Abdominal:      General: Abdomen is flat. Bowel sounds are normal. There is no distension.      Palpations: Abdomen is soft. There is no mass.      Tenderness: There is no abdominal tenderness. There is no right CVA tenderness, left CVA tenderness, guarding or rebound.      Hernia: No hernia is present.   Musculoskeletal:         General: No swelling, tenderness, deformity or signs of injury. Normal range of motion.      Cervical back: Normal range of motion and neck supple. No rigidity or tenderness.      Right lower leg: No edema.      Left lower leg: No edema.   Lymphadenopathy:      Cervical: No cervical adenopathy.   Skin:     General: Skin is warm and dry.      Capillary Refill: Capillary refill takes less than 2 seconds.      Coloration: Skin is not jaundiced or pale.      Findings: No bruising, erythema, lesion or rash.   Neurological:      General: No focal deficit present.      Mental Status: She is alert and oriented to person, place, and time. Mental status is at baseline.      Cranial Nerves: No cranial nerve deficit.      Sensory: No sensory deficit.      Motor: No weakness.      Coordination: Coordination normal.      Gait: Gait normal.      Deep Tendon Reflexes: Reflexes normal.   Psychiatric:         Mood and Affect: Mood normal.         Behavior: Behavior normal.         Thought Content: Thought content  normal.         Judgment: Judgment normal.       Procedures           ED Course                                           Medical Decision Making  Problems Addressed:  COVID-19: complicated acute illness or injury    Amount and/or Complexity of Data Reviewed  Labs: ordered.  Radiology: ordered.    Risk  Prescription drug management.        Final diagnoses:   COVID-19       ED Disposition  ED Disposition       ED Disposition   Discharge    Condition   Stable    Comment   --               Jeanette Allison, APRN  [address] [facility] [address]  [phone]    Call in 1 day           Medication List      No changes were made to your prescriptions during this visit.            Rogelio Templeton PA-C  08/26/23 2004

## 2023-09-29 ENCOUNTER — HOSPITAL ENCOUNTER (OUTPATIENT)
Dept: GENERAL RADIOLOGY | Facility: HOSPITAL | Age: 28
Discharge: HOME | End: 2023-09-29
Payer: MEDICAID

## 2023-09-29 ENCOUNTER — HOSPITAL ENCOUNTER (EMERGENCY)
Facility: HOSPITAL | Age: 28
Discharge: HOME | End: 2023-09-29
Attending: EMERGENCY MEDICINE
Payer: MEDICAID

## 2023-09-29 ENCOUNTER — TRANSCRIBE ORDERS (OUTPATIENT)
Dept: ADMINISTRATIVE | Facility: HOSPITAL | Age: 28
End: 2023-09-29
Payer: MEDICAID

## 2023-09-29 VITALS
BODY MASS INDEX: 21.34 KG/M2 | TEMPERATURE: 97.9 F | DIASTOLIC BLOOD PRESSURE: 62 MMHG | SYSTOLIC BLOOD PRESSURE: 116 MMHG | WEIGHT: 125 LBS | HEART RATE: 58 BPM | RESPIRATION RATE: 14 BRPM | HEIGHT: 64 IN | OXYGEN SATURATION: 100 %

## 2023-09-29 DIAGNOSIS — L73.9 FOLLICULITIS: Primary | ICD-10-CM

## 2023-09-29 DIAGNOSIS — M54.2 NECK PAIN: ICD-10-CM

## 2023-09-29 DIAGNOSIS — M54.9 BACK PAIN, UNSPECIFIED BACK LOCATION, UNSPECIFIED BACK PAIN LATERALITY, UNSPECIFIED CHRONICITY: ICD-10-CM

## 2023-09-29 DIAGNOSIS — M54.2 NECK PAIN: Primary | ICD-10-CM

## 2023-09-29 LAB
ALBUMIN SERPL-MCNC: 4.5 G/DL (ref 3.5–5.2)
ALBUMIN/GLOB SERPL: 1.5 G/DL
ALP SERPL-CCNC: 80 U/L (ref 39–117)
ALT SERPL W P-5'-P-CCNC: 7 U/L (ref 1–33)
AMPHET+METHAMPHET UR QL: NEGATIVE
AMPHETAMINES UR QL: NEGATIVE
ANION GAP SERPL CALCULATED.3IONS-SCNC: 10.8 MMOL/L (ref 5–15)
AST SERPL-CCNC: 16 U/L (ref 1–32)
B-HCG UR QL: NEGATIVE
BACTERIA UR QL AUTO: ABNORMAL /HPF
BARBITURATES UR QL SCN: NEGATIVE
BASOPHILS # BLD AUTO: 0.04 10*3/MM3 (ref 0–0.2)
BASOPHILS NFR BLD AUTO: 0.4 % (ref 0–1.5)
BENZODIAZ UR QL SCN: NEGATIVE
BILIRUB SERPL-MCNC: 0.3 MG/DL (ref 0–1.2)
BILIRUB UR QL STRIP: NEGATIVE
BUN SERPL-MCNC: 8 MG/DL (ref 6–20)
BUN/CREAT SERPL: 10.8 (ref 7–25)
BUPRENORPHINE SERPL-MCNC: NEGATIVE NG/ML
CALCIUM SPEC-SCNC: 9.4 MG/DL (ref 8.6–10.5)
CANNABINOIDS SERPL QL: NEGATIVE
CHLORIDE SERPL-SCNC: 106 MMOL/L (ref 98–107)
CLARITY UR: CLEAR
CO2 SERPL-SCNC: 24.2 MMOL/L (ref 22–29)
COCAINE UR QL: NEGATIVE
COLOR UR: YELLOW
CREAT SERPL-MCNC: 0.74 MG/DL (ref 0.57–1)
CRP SERPL-MCNC: 0.3 MG/DL (ref 0–0.5)
DEPRECATED RDW RBC AUTO: 40.4 FL (ref 37–54)
EGFRCR SERPLBLD CKD-EPI 2021: 111.8 ML/MIN/1.73
EOSINOPHIL # BLD AUTO: 0.24 10*3/MM3 (ref 0–0.4)
EOSINOPHIL NFR BLD AUTO: 2.6 % (ref 0.3–6.2)
ERYTHROCYTE [DISTWIDTH] IN BLOOD BY AUTOMATED COUNT: 12 % (ref 12.3–15.4)
ERYTHROCYTE [SEDIMENTATION RATE] IN BLOOD: 6 MM/HR (ref 0–20)
FENTANYL UR-MCNC: NEGATIVE NG/ML
GLOBULIN UR ELPH-MCNC: 3 GM/DL
GLUCOSE SERPL-MCNC: 93 MG/DL (ref 65–99)
GLUCOSE UR STRIP-MCNC: NEGATIVE MG/DL
HCT VFR BLD AUTO: 35.8 % (ref 34–46.6)
HGB BLD-MCNC: 11.8 G/DL (ref 12–15.9)
HGB UR QL STRIP.AUTO: ABNORMAL
HOLD SPECIMEN: NORMAL
HOLD SPECIMEN: NORMAL
HYALINE CASTS UR QL AUTO: ABNORMAL /LPF
IMM GRANULOCYTES # BLD AUTO: 0.02 10*3/MM3 (ref 0–0.05)
IMM GRANULOCYTES NFR BLD AUTO: 0.2 % (ref 0–0.5)
KETONES UR QL STRIP: NEGATIVE
LEUKOCYTE ESTERASE UR QL STRIP.AUTO: ABNORMAL
LYMPHOCYTES # BLD AUTO: 2.11 10*3/MM3 (ref 0.7–3.1)
LYMPHOCYTES NFR BLD AUTO: 22.4 % (ref 19.6–45.3)
MAGNESIUM SERPL-MCNC: 2.2 MG/DL (ref 1.6–2.6)
MCH RBC QN AUTO: 30.5 PG (ref 26.6–33)
MCHC RBC AUTO-ENTMCNC: 33 G/DL (ref 31.5–35.7)
MCV RBC AUTO: 92.5 FL (ref 79–97)
METHADONE UR QL SCN: NEGATIVE
MONOCYTES # BLD AUTO: 0.62 10*3/MM3 (ref 0.1–0.9)
MONOCYTES NFR BLD AUTO: 6.6 % (ref 5–12)
NEUTROPHILS NFR BLD AUTO: 6.37 10*3/MM3 (ref 1.7–7)
NEUTROPHILS NFR BLD AUTO: 67.8 % (ref 42.7–76)
NITRITE UR QL STRIP: NEGATIVE
NRBC BLD AUTO-RTO: 0 /100 WBC (ref 0–0.2)
OPIATES UR QL: NEGATIVE
OXYCODONE UR QL SCN: NEGATIVE
PCP UR QL SCN: NEGATIVE
PH UR STRIP.AUTO: 5.5 [PH] (ref 5–8)
PLATELET # BLD AUTO: 269 10*3/MM3 (ref 140–450)
PMV BLD AUTO: 10.3 FL (ref 6–12)
POTASSIUM SERPL-SCNC: 4.3 MMOL/L (ref 3.5–5.2)
PROPOXYPH UR QL: NEGATIVE
PROT SERPL-MCNC: 7.5 G/DL (ref 6–8.5)
PROT UR QL STRIP: NEGATIVE
RBC # BLD AUTO: 3.87 10*6/MM3 (ref 3.77–5.28)
RBC # UR STRIP: ABNORMAL /HPF
REF LAB TEST METHOD: ABNORMAL
SODIUM SERPL-SCNC: 141 MMOL/L (ref 136–145)
SP GR UR STRIP: 1.02 (ref 1–1.03)
SQUAMOUS #/AREA URNS HPF: ABNORMAL /HPF
TRICYCLICS UR QL SCN: NEGATIVE
TSH SERPL DL<=0.05 MIU/L-ACNC: 1.55 UIU/ML (ref 0.27–4.2)
UROBILINOGEN UR QL STRIP: ABNORMAL
WBC # UR STRIP: ABNORMAL /HPF
WBC NRBC COR # BLD: 9.4 10*3/MM3 (ref 3.4–10.8)
WHOLE BLOOD HOLD COAG: NORMAL
WHOLE BLOOD HOLD SPECIMEN: NORMAL

## 2023-09-29 PROCEDURE — 85025 COMPLETE CBC W/AUTO DIFF WBC: CPT | Performed by: PHYSICIAN ASSISTANT

## 2023-09-29 PROCEDURE — 85652 RBC SED RATE AUTOMATED: CPT | Performed by: PHYSICIAN ASSISTANT

## 2023-09-29 PROCEDURE — 83735 ASSAY OF MAGNESIUM: CPT | Performed by: PHYSICIAN ASSISTANT

## 2023-09-29 PROCEDURE — 81025 URINE PREGNANCY TEST: CPT | Performed by: PHYSICIAN ASSISTANT

## 2023-09-29 PROCEDURE — 72100 X-RAY EXAM L-S SPINE 2/3 VWS: CPT

## 2023-09-29 PROCEDURE — 80307 DRUG TEST PRSMV CHEM ANLYZR: CPT | Performed by: PHYSICIAN ASSISTANT

## 2023-09-29 PROCEDURE — 99282 EMERGENCY DEPT VISIT SF MDM: CPT

## 2023-09-29 PROCEDURE — 84443 ASSAY THYROID STIM HORMONE: CPT | Performed by: PHYSICIAN ASSISTANT

## 2023-09-29 PROCEDURE — 72050 X-RAY EXAM NECK SPINE 4/5VWS: CPT

## 2023-09-29 PROCEDURE — 80053 COMPREHEN METABOLIC PANEL: CPT | Performed by: PHYSICIAN ASSISTANT

## 2023-09-29 PROCEDURE — 81001 URINALYSIS AUTO W/SCOPE: CPT | Performed by: PHYSICIAN ASSISTANT

## 2023-09-29 PROCEDURE — 72072 X-RAY EXAM THORAC SPINE 3VWS: CPT

## 2023-09-29 PROCEDURE — 36415 COLL VENOUS BLD VENIPUNCTURE: CPT

## 2023-09-29 PROCEDURE — 86140 C-REACTIVE PROTEIN: CPT | Performed by: PHYSICIAN ASSISTANT

## 2023-09-29 RX ORDER — DOXYCYCLINE HYCLATE 100 MG/1
100 TABLET, DELAYED RELEASE ORAL 2 TIMES DAILY
Qty: 20 TABLET | Refills: 0 | Status: SHIPPED | OUTPATIENT
Start: 2023-09-29 | End: 2023-10-09

## 2023-09-29 RX ORDER — MUPIROCIN CALCIUM 20 MG/G
1 CREAM TOPICAL 3 TIMES DAILY
Qty: 15 G | Refills: 0 | Status: SHIPPED | OUTPATIENT
Start: 2023-09-29

## 2023-09-29 NOTE — ED PROVIDER NOTES
Subjective   History of Present Illness  27 yo female pt with hx of depression, anxiety, chronic pain, and schizoaffective presents to the ED with complaints of a red bump to the suprapubic area. Knot is TTP. Denies fevers, drainage, or streaking. Pt also reports she has been having pain in both arms and hands, states it feels like her tendons are pulling and her hands are going to draw up, no injury.  Denies any swelling or redness to extremities. No fevers.  Denies any worsening or alleviating factors.     History provided by:  Patient   used: No      Review of Systems   Constitutional: Negative.    HENT: Negative.     Eyes: Negative.    Respiratory: Negative.     Cardiovascular: Negative.    Gastrointestinal: Negative.    Endocrine: Negative.    Genitourinary: Negative.    Musculoskeletal: Negative.    Skin:         Abscess    Allergic/Immunologic: Negative.    Neurological: Negative.    Hematological: Negative.    Psychiatric/Behavioral: Negative.     All other systems reviewed and are negative.    Past Medical History:   Diagnosis Date    Anxiety     Chronic pain disorder     Depression     Psychosis     Schizoaffective disorder     Scoliosis     Substance abuse     Suicidal thoughts     Suicide attempt     cut wrist at 20 years old    Withdrawal symptoms, drug or narcotic        Allergies   Allergen Reactions    Codeine Itching    Ibuprofen GI Intolerance    Penicillins Hives    Toradol [Ketorolac Tromethamine] Cough       Past Surgical History:   Procedure Laterality Date    NO PAST SURGERIES         Family History   Problem Relation Age of Onset    Suicide Attempts Maternal Uncle     Suicide Attempts Father        Social History     Socioeconomic History    Marital status: Single   Tobacco Use    Smoking status: Every Day     Packs/day: 1.50     Years: 11.00     Pack years: 16.50     Types: Cigarettes    Smokeless tobacco: Never    Tobacco comments:     started smoking at 14 years old    Substance and Sexual Activity    Alcohol use: Not Currently    Drug use: Not Currently     Types: Methamphetamines    Sexual activity: Yes     Partners: Male           Objective   Physical Exam  Vitals and nursing note reviewed.   Constitutional:       Appearance: Normal appearance. She is normal weight.   HENT:      Head: Normocephalic and atraumatic.      Right Ear: External ear normal.      Left Ear: External ear normal.      Nose: Nose normal.      Mouth/Throat:      Mouth: Mucous membranes are moist.      Pharynx: Oropharynx is clear.   Eyes:      Extraocular Movements: Extraocular movements intact.      Conjunctiva/sclera: Conjunctivae normal.      Pupils: Pupils are equal, round, and reactive to light.   Cardiovascular:      Rate and Rhythm: Normal rate and regular rhythm.      Pulses: Normal pulses.      Heart sounds: Normal heart sounds.   Pulmonary:      Effort: Pulmonary effort is normal.      Breath sounds: Normal breath sounds.   Abdominal:      General: Bowel sounds are normal.      Palpations: Abdomen is soft.   Musculoskeletal:         General: Normal range of motion.      Cervical back: Normal range of motion and neck supple.      Comments: Full ROM of all extremities. No swelling or deformity. N/V intact.  No signs of trauma.    Skin:     General: Skin is warm and dry.      Capillary Refill: Capillary refill takes less than 2 seconds.          Neurological:      General: No focal deficit present.      Mental Status: She is alert and oriented to person, place, and time. Mental status is at baseline.   Psychiatric:         Mood and Affect: Mood normal.         Behavior: Behavior normal.         Thought Content: Thought content normal.         Judgment: Judgment normal.       Procedures           ED Course                Results for orders placed or performed during the hospital encounter of 09/29/23   Comprehensive Metabolic Panel    Specimen: Arm, Left; Blood   Result Value Ref Range    Glucose 93  65 - 99 mg/dL    BUN 8 6 - 20 mg/dL    Creatinine 0.74 0.57 - 1.00 mg/dL    Sodium 141 136 - 145 mmol/L    Potassium 4.3 3.5 - 5.2 mmol/L    Chloride 106 98 - 107 mmol/L    CO2 24.2 22.0 - 29.0 mmol/L    Calcium 9.4 8.6 - 10.5 mg/dL    Total Protein 7.5 6.0 - 8.5 g/dL    Albumin 4.5 3.5 - 5.2 g/dL    ALT (SGPT) 7 1 - 33 U/L    AST (SGOT) 16 1 - 32 U/L    Alkaline Phosphatase 80 39 - 117 U/L    Total Bilirubin 0.3 0.0 - 1.2 mg/dL    Globulin 3.0 gm/dL    A/G Ratio 1.5 g/dL    BUN/Creatinine Ratio 10.8 7.0 - 25.0    Anion Gap 10.8 5.0 - 15.0 mmol/L    eGFR 111.8 >60.0 mL/min/1.73   Urinalysis With Microscopic If Indicated (No Culture) - Urine, Clean Catch    Specimen: Urine, Clean Catch   Result Value Ref Range    Color, UA Yellow Yellow, Straw    Appearance, UA Clear Clear    pH, UA 5.5 5.0 - 8.0    Specific Gravity, UA 1.018 1.005 - 1.030    Glucose, UA Negative Negative    Ketones, UA Negative Negative    Bilirubin, UA Negative Negative    Blood, UA Moderate (2+) (A) Negative    Protein, UA Negative Negative    Leuk Esterase, UA Small (1+) (A) Negative    Nitrite, UA Negative Negative    Urobilinogen, UA 0.2 E.U./dL 0.2 - 1.0 E.U./dL   Pregnancy, Urine - Urine, Clean Catch    Specimen: Urine, Clean Catch   Result Value Ref Range    HCG, Urine QL Negative Negative   C-reactive Protein    Specimen: Arm, Left; Blood   Result Value Ref Range    C-Reactive Protein 0.30 0.00 - 0.50 mg/dL   Sedimentation Rate    Specimen: Arm, Left; Blood   Result Value Ref Range    Sed Rate 6 0 - 20 mm/hr   CBC Auto Differential    Specimen: Arm, Left; Blood   Result Value Ref Range    WBC 9.40 3.40 - 10.80 10*3/mm3    RBC 3.87 3.77 - 5.28 10*6/mm3    Hemoglobin 11.8 (L) 12.0 - 15.9 g/dL    Hematocrit 35.8 34.0 - 46.6 %    MCV 92.5 79.0 - 97.0 fL    MCH 30.5 26.6 - 33.0 pg    MCHC 33.0 31.5 - 35.7 g/dL    RDW 12.0 (L) 12.3 - 15.4 %    RDW-SD 40.4 37.0 - 54.0 fl    MPV 10.3 6.0 - 12.0 fL    Platelets 269 140 - 450 10*3/mm3    Neutrophil %  67.8 42.7 - 76.0 %    Lymphocyte % 22.4 19.6 - 45.3 %    Monocyte % 6.6 5.0 - 12.0 %    Eosinophil % 2.6 0.3 - 6.2 %    Basophil % 0.4 0.0 - 1.5 %    Immature Grans % 0.2 0.0 - 0.5 %    Neutrophils, Absolute 6.37 1.70 - 7.00 10*3/mm3    Lymphocytes, Absolute 2.11 0.70 - 3.10 10*3/mm3    Monocytes, Absolute 0.62 0.10 - 0.90 10*3/mm3    Eosinophils, Absolute 0.24 0.00 - 0.40 10*3/mm3    Basophils, Absolute 0.04 0.00 - 0.20 10*3/mm3    Immature Grans, Absolute 0.02 0.00 - 0.05 10*3/mm3    nRBC 0.0 0.0 - 0.2 /100 WBC   Urinalysis, Microscopic Only - Urine, Clean Catch    Specimen: Urine, Clean Catch   Result Value Ref Range    RBC, UA None Seen None Seen, 0-2 /HPF    WBC, UA 3-5 (A) None Seen, 0-2 /HPF    Bacteria, UA None Seen None Seen /HPF    Squamous Epithelial Cells, UA 3-6 (A) None Seen, 0-2 /HPF    Hyaline Casts, UA None Seen None Seen /LPF    Methodology Automated Microscopy    Urine Drug Screen - Urine, Clean Catch    Specimen: Urine, Clean Catch   Result Value Ref Range    THC, Screen, Urine Negative Negative    Phencyclidine (PCP), Urine Negative Negative    Cocaine Screen, Urine Negative Negative    Methamphetamine, Ur Negative Negative    Opiate Screen Negative Negative    Amphetamine Screen, Urine Negative Negative    Benzodiazepine Screen, Urine Negative Negative    Tricyclic Antidepressants Screen Negative Negative    Methadone Screen, Urine Negative Negative    Barbiturates Screen, Urine Negative Negative    Oxycodone Screen, Urine Negative Negative    Propoxyphene Screen Negative Negative    Buprenorphine, Screen, Urine Negative Negative   TSH    Specimen: Arm, Left; Blood   Result Value Ref Range    TSH 1.550 0.270 - 4.200 uIU/mL   Magnesium    Specimen: Arm, Left; Blood   Result Value Ref Range    Magnesium 2.2 1.6 - 2.6 mg/dL   Fentanyl, Urine - Urine, Clean Catch    Specimen: Urine, Clean Catch   Result Value Ref Range    Fentanyl, Urine Negative Negative   Green Top (Gel)   Result Value Ref Range     Extra Tube Hold for add-ons.    Lavender Top   Result Value Ref Range    Extra Tube hold for add-on    Gold Top - SST   Result Value Ref Range    Extra Tube Hold for add-ons.    Light Blue Top   Result Value Ref Range    Extra Tube Hold for add-ons.                                 Medical Decision Making  29 yo female pt with hx of depression, anxiety, chronic pain, and schizoaffective presents to the ED with complaints of a red bump to the suprapubic area. Knot is TTP. Denies fevers, drainage, or streaking. Pt also reports she has been having pain in both arms and hands, states it feels like her tendons are pulling and her hands are going to draw up, no injury.  Denies any swelling or redness to extremities. No fevers.  Denies any worsening or alleviating factors. ED stay uncomplicated. Pt instructed to avoid shaving, warm compresses and complete abx. Discussed sx and red flags that would warrant return to the ED.  Pt will f/u with PCP on Monday.     Problems Addressed:  Folliculitis: complicated acute illness or injury    Amount and/or Complexity of Data Reviewed  Labs: ordered.    Risk  Prescription drug management.        Final diagnoses:   Folliculitis       ED Disposition  ED Disposition       ED Disposition   Discharge    Condition   Stable    Comment   --               Romy Sanchez, MAXIME  [address]  [address]  [facility] [address]  [phone]    Schedule an appointment as soon as possible for a visit in 3 days           Medication List        New Prescriptions      doxycycline 100 MG enteric coated tablet  Commonly known as: DORYX  Take 1 tablet by mouth 2 (Two) Times a Day for 10 days.     mupirocin 2 % cream  Commonly known as: BACTROBAN  Apply 1 application  topically to the appropriate area as directed 3 (Three) Times a Day.               Where to Get Your Medications        These medications were sent to [facility] [address]  - [phone] [facility] [phone]  [place]  [address], [facility] [address]      Phone: [phone]   doxycycline 100 MG enteric coated tablet  mupirocin 2 % cream            Princess Castro PA  09/29/23 1315

## 2025-08-27 ENCOUNTER — TRANSCRIBE ORDERS (OUTPATIENT)
Dept: OBSTETRICS AND GYNECOLOGY | Facility: HOSPITAL | Age: 30
End: 2025-08-27
Payer: MEDICAID

## 2025-08-27 DIAGNOSIS — O43.129 VELAMENTOUS INSERTION OF UMBILICAL CORD, ANTEPARTUM: Primary | ICD-10-CM

## 2025-08-27 DIAGNOSIS — O99.320 SUBSTANCE ABUSE AFFECTING PREGNANCY, ANTEPARTUM: ICD-10-CM

## 2025-08-27 DIAGNOSIS — Z34.90 PREGNANCY, UNSPECIFIED GESTATIONAL AGE: ICD-10-CM

## 2025-08-27 DIAGNOSIS — F19.10 SUBSTANCE ABUSE AFFECTING PREGNANCY, ANTEPARTUM: ICD-10-CM
